# Patient Record
Sex: MALE | Race: WHITE | NOT HISPANIC OR LATINO | Employment: FULL TIME | URBAN - METROPOLITAN AREA
[De-identification: names, ages, dates, MRNs, and addresses within clinical notes are randomized per-mention and may not be internally consistent; named-entity substitution may affect disease eponyms.]

---

## 2017-07-29 ENCOUNTER — APPOINTMENT (OUTPATIENT)
Dept: LAB | Facility: MEDICAL CENTER | Age: 52
End: 2017-07-29
Payer: COMMERCIAL

## 2017-07-29 ENCOUNTER — TRANSCRIBE ORDERS (OUTPATIENT)
Dept: ADMINISTRATIVE | Facility: HOSPITAL | Age: 52
End: 2017-07-29

## 2017-07-29 DIAGNOSIS — Z00.8 HEALTH EXAMINATION IN POPULATION SURVEY: ICD-10-CM

## 2017-07-29 DIAGNOSIS — Z00.8 HEALTH EXAMINATION IN POPULATION SURVEY: Primary | ICD-10-CM

## 2017-07-29 LAB
CHOLEST SERPL-MCNC: 226 MG/DL (ref 50–200)
EST. AVERAGE GLUCOSE BLD GHB EST-MCNC: 128 MG/DL
HBA1C MFR BLD: 6.1 % (ref 4.2–6.3)
HDLC SERPL-MCNC: 44 MG/DL (ref 40–60)
LDLC SERPL CALC-MCNC: 141 MG/DL (ref 0–100)
TRIGL SERPL-MCNC: 205 MG/DL

## 2017-07-29 PROCEDURE — 83036 HEMOGLOBIN GLYCOSYLATED A1C: CPT

## 2017-07-29 PROCEDURE — 80061 LIPID PANEL: CPT

## 2017-07-29 PROCEDURE — 36415 COLL VENOUS BLD VENIPUNCTURE: CPT

## 2019-06-04 ENCOUNTER — OFFICE VISIT (OUTPATIENT)
Dept: FAMILY MEDICINE CLINIC | Facility: CLINIC | Age: 54
End: 2019-06-04
Payer: COMMERCIAL

## 2019-06-04 VITALS
TEMPERATURE: 99.5 F | BODY MASS INDEX: 38.25 KG/M2 | DIASTOLIC BLOOD PRESSURE: 92 MMHG | SYSTOLIC BLOOD PRESSURE: 140 MMHG | OXYGEN SATURATION: 95 % | HEART RATE: 84 BPM | WEIGHT: 259 LBS

## 2019-06-04 DIAGNOSIS — M17.11 ARTHRITIS OF RIGHT KNEE: Primary | ICD-10-CM

## 2019-06-04 DIAGNOSIS — Z13.1 SCREENING FOR DIABETES MELLITUS: ICD-10-CM

## 2019-06-04 DIAGNOSIS — Z13.220 SCREENING, LIPID: ICD-10-CM

## 2019-06-04 PROCEDURE — 80053 COMPREHEN METABOLIC PANEL: CPT | Performed by: FAMILY MEDICINE

## 2019-06-04 PROCEDURE — 36415 COLL VENOUS BLD VENIPUNCTURE: CPT | Performed by: FAMILY MEDICINE

## 2019-06-04 PROCEDURE — 85027 COMPLETE CBC AUTOMATED: CPT | Performed by: FAMILY MEDICINE

## 2019-06-04 PROCEDURE — 86430 RHEUMATOID FACTOR TEST QUAL: CPT | Performed by: FAMILY MEDICINE

## 2019-06-04 PROCEDURE — 99214 OFFICE O/P EST MOD 30 MIN: CPT | Performed by: FAMILY MEDICINE

## 2019-06-04 PROCEDURE — 80061 LIPID PANEL: CPT | Performed by: FAMILY MEDICINE

## 2019-06-04 PROCEDURE — 86618 LYME DISEASE ANTIBODY: CPT | Performed by: FAMILY MEDICINE

## 2019-06-04 PROCEDURE — 86140 C-REACTIVE PROTEIN: CPT | Performed by: FAMILY MEDICINE

## 2019-06-04 RX ORDER — FAMOTIDINE 10 MG
10 TABLET ORAL 2 TIMES DAILY
COMMUNITY

## 2019-06-04 RX ORDER — NAPROXEN 500 MG/1
500 TABLET ORAL 2 TIMES DAILY WITH MEALS
Qty: 60 TABLET | Refills: 0 | Status: SHIPPED | OUTPATIENT
Start: 2019-06-04 | End: 2019-08-20

## 2019-06-05 ENCOUNTER — HOSPITAL ENCOUNTER (OUTPATIENT)
Dept: RADIOLOGY | Facility: HOSPITAL | Age: 54
Discharge: HOME | End: 2019-06-05
Attending: FAMILY MEDICINE
Payer: COMMERCIAL

## 2019-06-05 DIAGNOSIS — M17.11 ARTHRITIS OF RIGHT KNEE: ICD-10-CM

## 2019-06-05 LAB
ALBUMIN SERPL BCP-MCNC: 3.9 G/DL (ref 3.5–5)
ALP SERPL-CCNC: 67 U/L (ref 46–116)
ALT SERPL W P-5'-P-CCNC: 35 U/L (ref 12–78)
ANION GAP SERPL CALCULATED.3IONS-SCNC: 5 MMOL/L (ref 4–13)
AST SERPL W P-5'-P-CCNC: 17 U/L (ref 5–45)
BILIRUB SERPL-MCNC: 0.46 MG/DL (ref 0.2–1)
BUN SERPL-MCNC: 15 MG/DL (ref 5–25)
CALCIUM SERPL-MCNC: 8.6 MG/DL (ref 8.3–10.1)
CHLORIDE SERPL-SCNC: 105 MMOL/L (ref 100–108)
CHOLEST SERPL-MCNC: 228 MG/DL (ref 50–200)
CO2 SERPL-SCNC: 27 MMOL/L (ref 21–32)
CREAT SERPL-MCNC: 0.94 MG/DL (ref 0.6–1.3)
CRP SERPL QL: <3 MG/L
ERYTHROCYTE [DISTWIDTH] IN BLOOD BY AUTOMATED COUNT: 13.5 % (ref 11.6–15.1)
GFR SERPL CREATININE-BSD FRML MDRD: 92 ML/MIN/1.73SQ M
GLUCOSE P FAST SERPL-MCNC: 92 MG/DL (ref 65–99)
HCT VFR BLD AUTO: 51.1 % (ref 36.5–49.3)
HDLC SERPL-MCNC: 47 MG/DL (ref 40–60)
HGB BLD-MCNC: 16.9 G/DL (ref 12–17)
LDLC SERPL CALC-MCNC: 149 MG/DL (ref 0–100)
MCH RBC QN AUTO: 32.1 PG (ref 26.8–34.3)
MCHC RBC AUTO-ENTMCNC: 33.1 G/DL (ref 31.4–37.4)
MCV RBC AUTO: 97 FL (ref 82–98)
NONHDLC SERPL-MCNC: 181 MG/DL
PLATELET # BLD AUTO: 245 THOUSANDS/UL (ref 149–390)
PMV BLD AUTO: 9.9 FL (ref 8.9–12.7)
POTASSIUM SERPL-SCNC: 4.2 MMOL/L (ref 3.5–5.3)
PROT SERPL-MCNC: 7.4 G/DL (ref 6.4–8.2)
RBC # BLD AUTO: 5.26 MILLION/UL (ref 3.88–5.62)
SODIUM SERPL-SCNC: 137 MMOL/L (ref 136–145)
TRIGL SERPL-MCNC: 158 MG/DL
WBC # BLD AUTO: 7.89 THOUSAND/UL (ref 4.31–10.16)

## 2019-06-05 PROCEDURE — 73562 X-RAY EXAM OF KNEE 3: CPT

## 2019-06-07 LAB — RHEUMATOID FACT SER QL LA: NEGATIVE

## 2019-06-08 LAB
B BURGDOR IGG SER IA-ACNC: 0.33
B BURGDOR IGM SER IA-ACNC: 0.74

## 2019-06-10 DIAGNOSIS — M17.11 ARTHRITIS OF RIGHT KNEE: Primary | ICD-10-CM

## 2019-07-18 VITALS
BODY MASS INDEX: 39.55 KG/M2 | DIASTOLIC BLOOD PRESSURE: 89 MMHG | HEART RATE: 80 BPM | HEIGHT: 69 IN | WEIGHT: 267 LBS | SYSTOLIC BLOOD PRESSURE: 142 MMHG

## 2019-07-18 DIAGNOSIS — M17.11 ARTHRITIS OF RIGHT KNEE: Primary | ICD-10-CM

## 2019-07-18 PROCEDURE — 99203 OFFICE O/P NEW LOW 30 MIN: CPT | Performed by: ORTHOPAEDIC SURGERY

## 2019-07-18 PROCEDURE — 20610 DRAIN/INJ JOINT/BURSA W/O US: CPT | Performed by: ORTHOPAEDIC SURGERY

## 2019-07-18 RX ADMIN — BETAMETHASONE SODIUM PHOSPHATE AND BETAMETHASONE ACETATE 6 MG: 3; 3 INJECTION, SUSPENSION INTRA-ARTICULAR; INTRALESIONAL; INTRAMUSCULAR; SOFT TISSUE at 16:00

## 2019-07-18 RX ADMIN — BUPIVACAINE HYDROCHLORIDE 2 ML: 2.5 INJECTION, SOLUTION INFILTRATION; PERINEURAL at 16:00

## 2019-07-18 RX ADMIN — LIDOCAINE HYDROCHLORIDE 2 ML: 10 INJECTION, SOLUTION EPIDURAL; INFILTRATION; INTRACAUDAL; PERINEURAL at 16:00

## 2019-07-18 NOTE — PROGRESS NOTES
Orthopaedic Surgery - Office Note  Grady Odom (47 y o  male)   : 1965   MRN: [id number]  Encounter Date: 2019    Chief Complaint   Patient presents with    Right Knee - Pain       Assessment / Plan  Right knee osteoarthritis    · Patient will begin outpatient PT   · Patient was advised to begin with Tylenol arthritis for pain  · A steroid injection was provided to the right knee joint today and tolerated well  · WBAT, activities as tolerated  · Patient was educated that weight loss will improve him symptoms  Return in about 1 month (around 8/15/2019)  History of Present Illness  Grady Odom is a 49 y o  male who presents initial evaluation regarding his right knee pain  He has been having right knee pain waxing and waning for few years  This recent onset began about 6 weeks to 2 months ago  He denies any obvious injury or trauma  He localizes pain to the medial aspect of the right knee  This does not radiate and is not associated numbness or tingling  He was initially treated by his PCP and prescribed him naproxen  He states that this upset his stomach and he is now taking ibuprofen  He complains of pain with steps, squatting and kneeling  His knee becomes stiff after sitting for long periods of time  Review of Systems  Pertinent items are noted in HPI  All other systems were reviewed and are negative  Physical Exam  /89   Pulse 80   Ht 5' 9" (1 753 m)   Wt 121 kg (267 lb)   BMI 39 43 kg/m²   Cons: Appears well  No apparent distress  Psych: Alert  Oriented x3  Mood and affect normal   Eyes: PERRLA, EOMI  Resp: Normal effort  No audible wheezing or stridor  CV: Palpable pulse  No discernable arrhythmia  No LE edema  Lymph:  No palpable cervical, axillary, or inguinal lymphadenopathy  Skin: Warm  No palpable masses  No visible lesions  Neuro: Normal muscle tone  Normal and symmetric DTR's       Right Knee Exam  Alignment:  Normal knee alignment  Inspection:  No swelling  No erythema  No ecchymosis  Palpation:  Medial joint line tenderness  ROM:  Normal knee ROM  Strength:  5/5 quadriceps and hamstrings  Stability:  No objective knee instability  Stable Varus / Valgus stress, Lachman, and Posterior drawer  Tests:  No pertinent positive or negative tests  Patella:  Patella tracks centrally with crepitus  Neurovascular:  Sensation intact in DP/SP/Shin/Sa/T nerve distributions  2+ DP & PT pulses  Brisk capillary refill in all toes  Toes warm and perfused  Gait:  Normal     Studies Reviewed  I have personally reviewed pertinent films in PACS and my interpretation is X-rays of the right knee performed 06/05/2019 show mild narrowing of the medial compartment  Large joint arthrocentesis: R knee  Date/Time: 7/18/2019 4:00 PM  Consent given by: patient  Site marked: site marked  Timeout: Immediately prior to procedure a time out was called to verify the correct patient, procedure, equipment, support staff and site/side marked as required   Supporting Documentation  Indications: pain   Procedure Details  Location: knee - R knee  Ultrasound guidance: no  Approach: anterolateral  Medications administered: 2 mL lidocaine (PF) 1 %; 2 mL bupivacaine 0 25 %; 6 mg betamethasone acetate-betamethasone sodium phosphate 6 (3-3) mg/mL    Patient tolerance: patient tolerated the procedure well with no immediate complications  Dressing:  Sterile dressing applied             Medical, Surgical, Family, and Social History  The patient's medical history, family history, and social history, were reviewed and updated as appropriate  Past Medical History:   Diagnosis Date    Acute suppurative otitis media of both ears without spontaneous rupture of tympanic membranes 9/2/2016    Other tenosynovitis of hand and wrist 9/26/2013       History reviewed  No pertinent surgical history  History reviewed  No pertinent family history      Social History Occupational History    Not on file   Tobacco Use    Smoking status: Current Every Day Smoker    Smokeless tobacco: Never Used   Substance and Sexual Activity    Alcohol use: Yes     Frequency: Monthly or less    Drug use: Never    Sexual activity: Not on file       No Known Allergies      Current Outpatient Medications:     famotidine (PEPCID) 10 mg tablet, Take 10 mg by mouth 2 (two) times a day, Disp: , Rfl:     naproxen (NAPROSYN) 500 mg tablet, Take 1 tablet (500 mg total) by mouth 2 (two) times a day with meals (Patient not taking: Reported on 7/18/2019), Disp: 60 tablet, Rfl: 0      Estefanía Rod MA    Scribe Attestation    I,:   Estefanía Rod MA am acting as a scribe while in the presence of the attending physician :        I,:   Nancie Hopper MD personally performed the services described in this documentation    as scribed in my presence :

## 2019-07-21 RX ORDER — LIDOCAINE HYDROCHLORIDE 10 MG/ML
2 INJECTION, SOLUTION EPIDURAL; INFILTRATION; INTRACAUDAL; PERINEURAL
Status: COMPLETED | OUTPATIENT
Start: 2019-07-18 | End: 2019-07-18

## 2019-07-21 RX ORDER — BETAMETHASONE SODIUM PHOSPHATE AND BETAMETHASONE ACETATE 3; 3 MG/ML; MG/ML
6 INJECTION, SUSPENSION INTRA-ARTICULAR; INTRALESIONAL; INTRAMUSCULAR; SOFT TISSUE
Status: COMPLETED | OUTPATIENT
Start: 2019-07-18 | End: 2019-07-18

## 2019-07-21 RX ORDER — BUPIVACAINE HYDROCHLORIDE 2.5 MG/ML
2 INJECTION, SOLUTION INFILTRATION; PERINEURAL
Status: COMPLETED | OUTPATIENT
Start: 2019-07-18 | End: 2019-07-18

## 2019-07-23 ENCOUNTER — EVALUATION (OUTPATIENT)
Dept: PHYSICAL THERAPY | Facility: CLINIC | Age: 54
End: 2019-07-23
Payer: COMMERCIAL

## 2019-07-23 DIAGNOSIS — M17.11 ARTHRITIS OF RIGHT KNEE: ICD-10-CM

## 2019-07-23 PROCEDURE — 97161 PT EVAL LOW COMPLEX 20 MIN: CPT | Performed by: PHYSICAL THERAPIST

## 2019-07-23 RX ORDER — IBUPROFEN 200 MG
TABLET ORAL ONCE
COMMUNITY
End: 2019-08-20

## 2019-07-23 NOTE — PROGRESS NOTES
PT Evaluation     Today's date: 2019  Patient name: Vanessa Choudhary  : 1965  MRN: [id number]  Referring provider: Mark Ambriz MD  Dx:   Encounter Diagnosis     ICD-10-CM    1  Arthritis of right knee M17 11 Ambulatory referral to Physical Therapy       Start Time: 917  Stop Time: 958  Total time in clinic (min): 41 minutes    ASSESSMENT/PLAN: Patient presents to [facility] with R knee pain causing decreased functional mobility  Patient symptoms are consistent with OA as he has more pain in the morning and improves as he gets moving, also has limited knee flexion PROM on the R  Patient has good LE strength but lacks eccentric control with functional movements  Patient would benefit from skilled physical therapy to decrease knee pain and improve function  Frequency/Duration: 2x per week, 6-8 weeks    STG:(4 weeks)  1) Patient R LE strength will be WNL and pain free  2) Patient pain at worst will be 6/10 or less  3) Patient right knee flexion PROM will be >120 degrees  4) Patient will step down from 6" step leading w/ L, pain <4/10    LTG:(8 weeks)  1) Patient pain at worst will be 3/10 or less  2) Patient R knee flexion PROM will be within 3 degrees of L knee  3) Patient will ascend/descend flight of stairs step over step w/ pain <3/10  4) Patient will be able to walk 2 blocks w/ no difficulty  Interventions: manual therapy, ROM, stretching, strengthening, therapeutic activities, neuromuscular re-ed and instruction in HEP  SUBJECTIVE: Patient reports having pain in the medial side of right knee for about one year, mostly when he bends it  Also had pain shoot towards the posterior knee last night  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[employer], 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  He is trying to stay off of it as much as he can, so having difficulty doing stuff at home  Had an injection in the knee 5 days ago, feels like it made it worse  When the pain comes on it feels better to straighten the knee  Pain is worse in the morning, feels better as he starts to move it around but hurts again at the end of the day  Patient goal is to get rd of the pain  Pain levels:    Best- 0/10   Worst- 8-9/10    OBJECTIVE:      Palpation: Mild tenderness to palpation medial knee    Gait: Slightly wide base of support, limited hip rotation    Functional Strength:    Step up - WNL bilateral, no pain   Step Down - good control leading with R, poor control and pain leading w/ L   Sit to Stand - able to perform w/o using UE, no pain    Special tests:    Varus stress - R brought on medial knee pain   Valgus stress - normal bilateral    Lumbar Screen   Flexion - WNL and brought on mild knee pain    Extension - mild limitation no pain   Lateral Flexion - WNL no pain    Patellar Mobility   WNL all directions, superior and inferior glides caused some pain and discomfort  Hip      Strength       AROM    PROM                R               L                  R               L             R             L   Abduction 5/5 5/5       Flexion 5/5* 5/5   WNL WNL   Extension 5/5* 5/5   WNL WNL   ER 5/5 5/5   WNL WNL   IR 4+/5* 5/5   0* 6   Adduction 5/5 5/5                  Knee/Ankle      Strength       AROM    PROM                R               L                  R               L             R             L   Flexion 5/5 5/5   112* overpr 129   Extension 5/5 5/5   1 HE 1 HE   PF         DF 5/5 5/5       Inversion 5/5 5/5       Eversion 5/5 5/5                           Neruo screen:   Sensation - intact to light touch   Strength - vinny  LE strength WNL   Reflexes - WNL vinny   Achilles/quad            Flowsheet Rows      Most Recent Value   PT/OT G-Codes   Current Score  41   Projected Score  59             Daily Treatment Record:    POC expires: 8/23  PRECAUTIONS: NONE  PMH: UNREMARKABLE      Manual  7/23            R knee PROM             R knee distraction             R patellar mobs sup/inf             Hip Lateral distraction                               Exercise Diary  7/23            Bike             Step ups 6"             Step downs 4"             Mini Lunge             AROM knee flexion             Supine hip IR straight leg             Reverse clam shell IR             AAROM t-band IR             Lat stepping in squat             Sit-to-stand controlled sit                                                                                                                                                   Modalities  7/23            CP R knee

## 2019-07-25 ENCOUNTER — OFFICE VISIT (OUTPATIENT)
Dept: PHYSICAL THERAPY | Facility: CLINIC | Age: 54
End: 2019-07-25
Payer: COMMERCIAL

## 2019-07-25 DIAGNOSIS — M17.11 ARTHRITIS OF RIGHT KNEE: Primary | ICD-10-CM

## 2019-07-25 PROCEDURE — 97140 MANUAL THERAPY 1/> REGIONS: CPT

## 2019-07-25 PROCEDURE — 97110 THERAPEUTIC EXERCISES: CPT

## 2019-07-25 NOTE — PROGRESS NOTES
Daily Note     Today's date: 2019  Patient name: Eunice Cardenas  : 1965  MRN: [id number]  Referring provider: Sarita Hernandez MD  Dx:   Encounter Diagnosis     ICD-10-CM    1  Arthritis of right knee M17 11                   Subjective: Patient noted " I worked night shift and the pain in my R knee wasn't too bad just a little right here ( medial knee) "      Objective: See treatment diary below      Assessment: Tolerated treatment well  Patient noted no pain throughout treatment while performing exercises  Slight VC to correct form for lateral stepping in squat  Patient responded well to manual treatment with no pain noted  Patient noted no pain post treatment  Patient would benefit from continued PT      Plan: Continue per plan of care        Daily Treatment Record:    POC expires:   PRECAUTIONS: NONE  PMH: UNREMARKABLE      Manual             R knee PROM  af           R knee distraction             R patellar mobs sup/inf  patella PROM af           Hip Lateral distraction                               Exercise Diary             Bike  10'           Step ups 6"  10xea           Step downs 4"  10xea           Mini Lunge  10xea           AROM knee flexion             Supine hip IR straight leg  10x           Reverse clam shell IR  10x           AAROM t-band IR             Lat stepping in squat  3 laps            Sit-to-stand controlled sit  15x                                                                                                                                                 Modalities             CP R knee  Not needed

## 2019-07-30 ENCOUNTER — OFFICE VISIT (OUTPATIENT)
Dept: PHYSICAL THERAPY | Facility: CLINIC | Age: 54
End: 2019-07-30
Payer: COMMERCIAL

## 2019-07-30 DIAGNOSIS — M17.11 ARTHRITIS OF RIGHT KNEE: Primary | ICD-10-CM

## 2019-07-30 PROCEDURE — 97110 THERAPEUTIC EXERCISES: CPT | Performed by: PHYSICAL THERAPIST

## 2019-07-30 PROCEDURE — 97140 MANUAL THERAPY 1/> REGIONS: CPT | Performed by: PHYSICAL THERAPIST

## 2019-07-30 NOTE — PROGRESS NOTES
Daily Note     Today's date: 2019  Patient name: Anya Oropeza  : 1965  MRN: [id number]  Referring provider: Cheryln Cowden, MD  Dx:   Encounter Diagnosis     ICD-10-CM    1  Arthritis of right knee M17 11                   Subjective:  Patient reports feeling tightness 2 days after the last PT session in distal R quadriceps, but OK today  Objective:  See treatment diary below      Assessment:  Pt had little R knee pain today with all exercises  R H/S and R patellar mobility are still very limited and cause pain at times especially with WB activities  Patient would benefit from continued PT  Plan:  Add light weights to LE PREs          Daily Treatment Record:      POC expires:   PRECAUTIONS: NONE  PMH: UNREMARKABLE      Manual            R knee PROM  af           R knee distraction             R patellar mobs sup/inf  patella PROM af 5'          R H/S stretch   5'                           Exercise Diary            Bike  10' 10'          Step ups 6"  10xea D/C          Step downs 4"  10xea D/C          Mini Lunge  10xea NV          AROM knee flexion             Supine R straight leg  10x 30          Reverse clam shell IR  10x NP          AAROM t-band IR             Lat stepping in squat at ll bars  3 laps  5 laps x 10ft          Sit-to-stand controlled sit  15x 10          Step overs up R, down L   8" 20          8" step stretch R knee flex   10" 10          LAQ R   30 3#         Strap R H/S stretch supine   20" 10          Bridge with ball adduction   NV          SAQ R   NV                                                                  Modalities             CP R knee  Not needed

## 2019-08-01 ENCOUNTER — OFFICE VISIT (OUTPATIENT)
Dept: PHYSICAL THERAPY | Facility: CLINIC | Age: 54
End: 2019-08-01
Payer: COMMERCIAL

## 2019-08-01 DIAGNOSIS — M17.11 ARTHRITIS OF RIGHT KNEE: Primary | ICD-10-CM

## 2019-08-01 PROCEDURE — 97110 THERAPEUTIC EXERCISES: CPT | Performed by: PHYSICAL THERAPIST

## 2019-08-01 PROCEDURE — 97140 MANUAL THERAPY 1/> REGIONS: CPT | Performed by: PHYSICAL THERAPIST

## 2019-08-01 NOTE — PROGRESS NOTES
Daily Note     Today's date: 2019  Patient name: Juliette Kerns  : 1965  MRN: [id number]  Referring provider: Lon Blair MD  Dx:   Encounter Diagnosis     ICD-10-CM    1  Arthritis of right knee M17 11                   Subjective: Pt had no tightness in his R quadricep after last visit  Pt is experiencing increased medial knee pain rated 3-4/10 after work activies      Objective: See treatment diary below      Assessment: Tolerated treatment well  Patient exhibited good technique with therapeutic exercises      Plan: Continue per plan of care  trial of K tape for support       Daily Treatment Record:      POC expires:   PRECAUTIONS: NONE  PMH: UNREMARKABLE      Manual           R knee PROM  af  th         R knee distraction             R patellar mobs sup/inf  patella PROM af 5' th         R H/S stretch   5'          GISTM R quad    th             Exercise Diary           Bike  10' 10' 10'         Step ups 6"  10xea D/C          Step downs 4"  10xea D/C          Mini Lunge  10xea NV 10 B         AROM knee flexion             Supine R straight leg  10x 30 2x15         Reverse clam shell IR  10x NP 10         AAROM t-band IR             Lat stepping in squat at ll bars  3 laps  5 laps x 10ft 4 laps P TB         Sit-to-stand controlled sit  15x 10 10         Step overs up R, down L   8" 20          8" step stretch R knee flex   10" 10 10 x10"         LAQ R   30 3# x15         Strap R H/S stretch supine   20" 10 20"x3         Bridge with ball adduction   NV 10 x10"         SAQ R   NV 3# x15                                                                 Modalities             CP R knee  Not needed

## 2019-08-06 ENCOUNTER — OFFICE VISIT (OUTPATIENT)
Dept: PHYSICAL THERAPY | Facility: CLINIC | Age: 54
End: 2019-08-06
Payer: COMMERCIAL

## 2019-08-06 DIAGNOSIS — M17.11 ARTHRITIS OF RIGHT KNEE: Primary | ICD-10-CM

## 2019-08-06 PROCEDURE — 97110 THERAPEUTIC EXERCISES: CPT | Performed by: PHYSICAL THERAPIST

## 2019-08-06 PROCEDURE — 97140 MANUAL THERAPY 1/> REGIONS: CPT | Performed by: PHYSICAL THERAPIST

## 2019-08-06 PROCEDURE — 97530 THERAPEUTIC ACTIVITIES: CPT | Performed by: PHYSICAL THERAPIST

## 2019-08-06 PROCEDURE — 97112 NEUROMUSCULAR REEDUCATION: CPT | Performed by: PHYSICAL THERAPIST

## 2019-08-08 ENCOUNTER — OFFICE VISIT (OUTPATIENT)
Dept: PHYSICAL THERAPY | Facility: CLINIC | Age: 54
End: 2019-08-08
Payer: COMMERCIAL

## 2019-08-08 DIAGNOSIS — M17.11 ARTHRITIS OF RIGHT KNEE: Primary | ICD-10-CM

## 2019-08-08 PROCEDURE — 97140 MANUAL THERAPY 1/> REGIONS: CPT | Performed by: PHYSICAL THERAPIST

## 2019-08-08 PROCEDURE — 97110 THERAPEUTIC EXERCISES: CPT | Performed by: PHYSICAL THERAPIST

## 2019-08-08 PROCEDURE — 97530 THERAPEUTIC ACTIVITIES: CPT | Performed by: PHYSICAL THERAPIST

## 2019-08-08 PROCEDURE — 97112 NEUROMUSCULAR REEDUCATION: CPT | Performed by: PHYSICAL THERAPIST

## 2019-08-08 NOTE — PROGRESS NOTES
Daily Note     Today's date: 2019  Patient name: Becca Ibrahim  : 1965  MRN: [id number]  Referring provider: Mandy Tanner MD  Dx:   Encounter Diagnosis     ICD-10-CM    1  Arthritis of right knee M17 11        Start Time: 920  Stop Time: 1000  Total time in clinic (min): 40 minutes    Subjective: Patient reports that Tuesday night his medial knee was very painful  He is unsure of what specific activity/movement may have provoked these symptoms  Symptoms are improved today with min pain reported  Objective: See treatment diary below      Assessment: Patient tolerated treatment well and denied any exacerbation of sx following today's session  Quad lag continues to be present with SLR, and patient required increased recovery periods in order to complete all repetitions  In addition, min cueing provided to avoid crossing midline with LE  Continue to address strength deficits, as tolerated  Plan: Continue per plan of care        Daily Treatment Record:      POC expires:   PRECAUTIONS: NONE  PMH: UNREMARKABLE      Manual         R knee PROM  af  th 2' 2'       R knee distraction             R patellar mobs sup/inf  patella PROM af 5' th 5' 6'       R H/S stretch   5'  5'        GISTM R quad    th             Exercise Diary         Bike  10' 10' 10' L3 10' L3 10'       Step ups 6"  10xea D/C          Step downs 4"  10xea D/C          Mini Lunge  10xea NV 10 B 10 L/R nv       AROM knee flexion             Supine R straight leg  10x 30 2x15 3# 2x15 3# 3x10       Reverse clam shell IR  10x NP 10 15 20x       AAROM t-band IR             Lat stepping in squat at ll bars  3 laps  5 laps x 10ft 4 laps P TB 5 laps Blue TB 5 laps blue TB       Sit-to-stand controlled sit  15x 10 10 10 10x       Step overs up R, down L   8" 20  8" 20 8# 20x       8" step stretch R knee flex   10" 10 10 x10" 10" 10 10x10"       LAQ R   30 3# x15 3# 30 3#x30 Strap R H/S stretch supine   20" 10 20"x3 20" 5 5x20"       Bridge with ball adduction   NV 10 x10" 5" 20 20x5"       SAQ R   NV 3# x15 3# 30 3#x30                                                               Modalities  7/23 7/25           CP R knee  Not needed

## 2019-08-13 ENCOUNTER — OFFICE VISIT (OUTPATIENT)
Dept: PHYSICAL THERAPY | Facility: CLINIC | Age: 54
End: 2019-08-13
Payer: COMMERCIAL

## 2019-08-13 DIAGNOSIS — M17.11 ARTHRITIS OF RIGHT KNEE: Primary | ICD-10-CM

## 2019-08-13 PROCEDURE — 97110 THERAPEUTIC EXERCISES: CPT | Performed by: PHYSICAL THERAPIST

## 2019-08-13 PROCEDURE — 97530 THERAPEUTIC ACTIVITIES: CPT | Performed by: PHYSICAL THERAPIST

## 2019-08-13 PROCEDURE — 97112 NEUROMUSCULAR REEDUCATION: CPT | Performed by: PHYSICAL THERAPIST

## 2019-08-13 PROCEDURE — 97140 MANUAL THERAPY 1/> REGIONS: CPT | Performed by: PHYSICAL THERAPIST

## 2019-08-13 NOTE — PROGRESS NOTES
Daily Note     Today's date: 2019  Patient name: Ambrose Piña  : 1965  MRN: [id number]  Referring provider: Bianca Vitlae MD  Dx:   Encounter Diagnosis     ICD-10-CM    1  Arthritis of right knee M17 11                   Subjective:  Patient reports that he has much less pain today vs last week  Still min R knee swelling though  Objective:  See treatment diary below      Assessment:  Patient showed better R knee ROM and H/S flexibility today with min less pain  Better functional movement noted with side lunges and steps  Still mod weakness in R quad with rapid fatigue with supine SLR, but no extensor lag even with increased weight today  Plan:  Continue to focus on quad strengthening  Pt 2x/wk x 3-4 weeks          Daily Treatment Record:      POC expires:   PRECAUTIONS: NONE  PMH: UNREMARKABLE      Manual        R knee PROM  af  th 2' 2' 2'      R knee distraction             R patellar mobs sup/inf  patella PROM af 5' th 5' 6' 3'      R H/S stretch   5'  5'  3'      GISTM R quad    th             Exercise Diary        Bike  10' 10' 10' L3 10' L3 10' L3 11'      Step ups 6"  10xea D/C          Step downs 4"  10xea D/C          Mini Lunge  10xea NV 10 B 10 L/R nv 10 ea      AROM knee flexion             Supine R straight leg  10x 30 2x15 3# 2x15 3# 3x10 4# 3x10      Reverse clam shell IR  10x NP 10 15 20x NP      AAROM t-band IR             Lat stepping in squat at ll bars  3 laps  5 laps x 10ft 4 laps P TB 5 laps Blue TB 5 laps blue TB 5 laps blue TB      Sit-to-stand controlled sit  15x 10 10 10 10x 10      Step overs up R, down L   8" 20  8" 20 8# 20x 8" 20      8" step stretch R knee flex   10" 10 10 x10" 10" 10 10x10" 10" 10      LAQ R   30 3# x15 3# 30 3#x30 4# 30      Strap R H/S stretch supine   20" 10 20"x3 20" 5 5x20" 20" 5      Bridge with ball adduction   NV 10 x10" 5" 20 20x5" 5" 20      SAQ R   NV 3# x15 3# 30 3#x30 4# 30                                                              Modalities  7/23 7/25           CP R knee  Not needed

## 2019-08-15 ENCOUNTER — APPOINTMENT (OUTPATIENT)
Dept: PHYSICAL THERAPY | Facility: CLINIC | Age: 54
End: 2019-08-15
Payer: COMMERCIAL

## 2019-08-20 ENCOUNTER — APPOINTMENT (OUTPATIENT)
Dept: PHYSICAL THERAPY | Facility: CLINIC | Age: 54
End: 2019-08-20
Payer: COMMERCIAL

## 2019-08-20 ENCOUNTER — OFFICE VISIT (OUTPATIENT)
Dept: OBGYN CLINIC | Facility: HOSPITAL | Age: 54
End: 2019-08-20
Payer: COMMERCIAL

## 2019-08-20 VITALS
WEIGHT: 265 LBS | BODY MASS INDEX: 39.25 KG/M2 | SYSTOLIC BLOOD PRESSURE: 136 MMHG | HEART RATE: 85 BPM | DIASTOLIC BLOOD PRESSURE: 81 MMHG | HEIGHT: 69 IN

## 2019-08-20 DIAGNOSIS — M17.11 ARTHRITIS OF RIGHT KNEE: Primary | ICD-10-CM

## 2019-08-20 DIAGNOSIS — S83.411A SPRAIN OF MEDIAL COLLATERAL LIGAMENT OF RIGHT KNEE, INITIAL ENCOUNTER: ICD-10-CM

## 2019-08-20 PROCEDURE — 99213 OFFICE O/P EST LOW 20 MIN: CPT | Performed by: ORTHOPAEDIC SURGERY

## 2019-08-21 NOTE — PROGRESS NOTES
49 y o  male presenting to the office for follow up of right knee pain  Patient states that he is still having a lot of pain in the knee, with days that he can ambulate regularly, and days where he cannot ambulate at all  He did not notice any significant improvement with the steroid injection  He has had good days with PT, but overall has not noticed any significant improvement with PT  He denies any new falls, accidents or injuries  He denies any other acute complaints  ROS  Review of Systems   Constitutional: Negative for fever and unexpected weight change  HENT: Negative for hearing loss, nosebleeds and sore throat  Eyes: Negative for pain, redness and visual disturbance  Respiratory: Negative for cough, shortness of breath and wheezing  Cardiovascular: Negative for chest pain, palpitations and leg swelling  Gastrointestinal: Negative for abdominal pain, nausea and vomiting  Endocrine: Negative for polydipsia and polyuria  Genitourinary: Negative for dysuria and hematuria  Skin: Negative for rash and wound  Neurological: Negative for dizziness and headaches  Psychiatric/Behavioral: Negative for agitation and suicidal ideas  Past Medical History:   Diagnosis Date    Acute suppurative otitis media of both ears without spontaneous rupture of tympanic membranes 9/2/2016    Arthritis     Other tenosynovitis of hand and wrist 9/26/2013     History reviewed  No pertinent surgical history  Results Reviewed     None          Physical Exam  Physical Exam   Constitutional: He is oriented to person, place, and time  He appears well-developed and well-nourished  HENT:   Head: Normocephalic and atraumatic  Eyes: Pupils are equal, round, and reactive to light  EOM are normal    Neck: Neck supple  No tracheal deviation present  Cardiovascular: Normal rate and regular rhythm  Pulmonary/Chest: Effort normal and breath sounds normal    Abdominal: There is no guarding     Musculoskeletal: Right knee: He exhibits no effusion  Neurological: He is alert and oriented to person, place, and time  Skin: Skin is warm and dry  Psychiatric: He has a normal mood and affect  His behavior is normal      Right Knee Exam     Muscle Strength   The patient has normal right knee strength  Tenderness   The patient is experiencing tenderness in the MCL  Range of Motion   The patient has normal right knee ROM  Tests   Varus: positive Valgus: negative    Other   Erythema: absent  Sensation: normal  Swelling: none  Effusion: no effusion present        Assessment/Plan  49 y o  male    1  Arthritis of right knee  - diclofenac sodium (VOLTAREN) 1 %; Apply 2 g topically 4 (four) times a day  Dispense: 1 Tube; Refill: 1    2  Sprain of medial collateral ligament of right knee, initial encounter  - diclofenac sodium (VOLTAREN) 1 %; Apply 2 g topically 4 (four) times a day  Dispense: 1 Tube; Refill: 1  - ordered a brace for patient to help with some stability  Recommend a change to PT to focus on MCL more closely  Follow up in 4 weeks for re-evaluation   May consider MRI at that time

## 2019-08-22 ENCOUNTER — EVALUATION (OUTPATIENT)
Dept: PHYSICAL THERAPY | Facility: CLINIC | Age: 54
End: 2019-08-22
Payer: COMMERCIAL

## 2019-08-22 DIAGNOSIS — M17.11 ARTHRITIS OF RIGHT KNEE: Primary | ICD-10-CM

## 2019-08-22 PROCEDURE — 97112 NEUROMUSCULAR REEDUCATION: CPT | Performed by: PHYSICAL THERAPIST

## 2019-08-22 PROCEDURE — 97140 MANUAL THERAPY 1/> REGIONS: CPT | Performed by: PHYSICAL THERAPIST

## 2019-08-22 PROCEDURE — 97110 THERAPEUTIC EXERCISES: CPT | Performed by: PHYSICAL THERAPIST

## 2019-08-22 PROCEDURE — 97530 THERAPEUTIC ACTIVITIES: CPT | Performed by: PHYSICAL THERAPIST

## 2019-08-22 NOTE — PROGRESS NOTES
PT Evaluation     Today's date: 2019  Patient name: Nakul Borges  : 1965  MRN: [id number]  Referring provider: Mateo Boyd MD  Dx:   Encounter Diagnosis     ICD-10-CM    1  Arthritis of right knee M17 11                   ASSESSMENT/PLAN:  Patient presented to [facility] with R knee pain causing decreased functional mobility  Patient symptoms are consistent with OA as he has more pain in the morning and usually improves as he gets moving, also had limited knee flexion PROM on the R, but that has improved with PT  Patient has good overall LE strength  He will continue to benefit from skilled physical therapy to decrease knee pain and improve function  Pt felt mod knee pain relief after starting new wedge stretch for R calf  FOTO scores show min positive changes  Frequency/Duration: 2x per week, 4 weeks    STG:(2 weeks)  1) Patient R LE strength will be WNL and pain free - Partially Met  2) Patient pain at worst will be 6/10 or less - Mostly Met  3) Patient right knee flexion PROM will be >120 degrees - Met  4) Patient will step down from 6" step leading w/ L, pain <4/10 - Met    LTG:(4 weeks)  1) Patient pain at worst will be 3/10 or less - Not Met  2) Patient R knee flexion PROM will be within 3 degrees of L knee - Met  3) Patient will ascend/descend flight of stairs step over step w/ pain <3/10 - Met  4) Patient will be able to walk 2 blocks w/ no difficulty - Met  5) No tightness R LE in 4 weeks   6) FOTO = 59/100 in 4 weeks    Interventions: manual therapy, ROM, stretching, strengthening, therapeutic activities, neuromuscular re-ed and instruction in HEP  SUBJECTIVE:  Patient reports having pain in the medial side of right knee for about one year, mostly when he bends it  That has improved with PT, but still has times when R knee is very painful without cause    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[employer], 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   Has new pain cream that MD gave him yesterday but has not used it yet  When the pain comes on it feels better to straighten the knee  Pain is worse in the morning, feels better as he starts to move it around but hurts again at the end of the day  Patient goal is to get rd of the pain  Pain levels:    Best- 0/10   Worst- 6/10    OBJECTIVE:      Palpation: Mild tenderness to palpation medial knee    Gait: WNL    Special tests:    Varus stress - R brought on medial knee pain   Valgus stress - normal bilateral    Patellar Mobility   WNL all directions, superior and inferior glides caused some pain and discomfort  Hip      Strength       AROM    PROM                R               L                  R               L             R             L   Abduction 5/5 5/5       Flexion 5/5 5/5   WNL WNL   Extension 5/5 5/5   WNL WNL   ER 5/5 5/5   WNL WNL   IR 5/5 5/5       Adduction 5/5 5/5                  Knee/Ankle      Strength       AROM    PROM                R               L                  R               L             R             L   Flexion 5/5 5/5   112*  129   Extension 5/5 5/5   0 0   PF         DF 5/5 5/5       Inversion 5/5 5/5       Eversion 5/5 5/5                           Neruo screen:   Sensation - intact to light touch   Strength - vinny  LE strength WNL   Reflexes - WNL vinny   Achilles/quad                   Daily Treatment Record:    POC expires: 9/19/19  PRECAUTIONS: NONE  PMH: UNREMARKABLE        Manual  8/22              R knee PROM  2'              R patellar mobs sup/inf  3'              R H/S stretch  3'                                    Exercise Diary  8/22              Bike  L3 10'              Mini Lunge  10 R              Supine R straight leg  4# 3x10              Lat stepping in squat at ll bars  5 laps blue TB              Step overs up R, down L  8" 20              8" step stretch R knee flex  10" 10              LAQ R  4# 30              Strap R H/S stretch supine  20" 5       Bridge with ball adduction  5" 20              SAQ R  4# 30               Wedge calf stretch R 20" 5                                                                                      Modalities                      CP R knee

## 2019-08-27 ENCOUNTER — OFFICE VISIT (OUTPATIENT)
Dept: PHYSICAL THERAPY | Facility: CLINIC | Age: 54
End: 2019-08-27
Payer: COMMERCIAL

## 2019-08-27 DIAGNOSIS — M17.11 ARTHRITIS OF RIGHT KNEE: Primary | ICD-10-CM

## 2019-08-27 PROCEDURE — 97112 NEUROMUSCULAR REEDUCATION: CPT

## 2019-08-27 PROCEDURE — 97110 THERAPEUTIC EXERCISES: CPT

## 2019-08-27 PROCEDURE — 97530 THERAPEUTIC ACTIVITIES: CPT

## 2019-08-27 PROCEDURE — 97140 MANUAL THERAPY 1/> REGIONS: CPT

## 2019-08-27 NOTE — PROGRESS NOTES
Daily Note     Today's date: 2019  Patient name: Pat Hooper  : 1965  MRN: [id number]  Referring provider: Roxana Mancilla MD  Dx:   Encounter Diagnosis     ICD-10-CM    1  Arthritis of right knee M17 11                   Subjective: Pt reports feeling "a little sore" this AM   Notes this pain is "nothing like it used to be though "      Objective: See treatment diary below      Assessment: Tolerated treatment well  Was able to perform all exercise with no significant c/o increased pain  Occasional verbal cues required for proper form/intensity with assigned exercise throughout session  Continues to be most challenged with resisted SLR, demonstrating slight quad lag for last few reps  Patient would benefit from continued PT to further improve strength, reduce pain levels, and improve function  Plan: Continue per plan of care  Progress as able       Daily Treatment Record:    POC expires: 19  PRECAUTIONS: NONE  PMH: UNREMARKABLE        Manual               R knee PROM  2' 2'             R patellar mobs sup/inf  3' 3'             R H/S stretch  3' 3'                                   Exercise Diary               Bike  L3 10' L3 10'             Mini Lunge  10 R 10 R             Supine R straight leg  4# 3x10 4# 3x10             Lat stepping in squat at ll bars  5 laps blue TB 5 laps  Blue  TB             Step overs up R, down L  8" 20 8"  x20             8" step stretch R knee flex  10" 10 10"x10             LAQ R  4# 30 4# x30             Strap R H/S stretch supine  20" 5 20"x5             Bridge with ball adduction  5" 20 5" 20             SAQ R  4# 30 4# x30              Wedge calf stretch R 20" 5  30"x3                                                                                    Modalities                      CP R knee

## 2019-08-29 ENCOUNTER — OFFICE VISIT (OUTPATIENT)
Dept: PHYSICAL THERAPY | Facility: CLINIC | Age: 54
End: 2019-08-29
Payer: COMMERCIAL

## 2019-08-29 DIAGNOSIS — M17.11 ARTHRITIS OF RIGHT KNEE: Primary | ICD-10-CM

## 2019-08-29 PROCEDURE — 97112 NEUROMUSCULAR REEDUCATION: CPT | Performed by: PHYSICAL THERAPIST

## 2019-08-29 PROCEDURE — 97110 THERAPEUTIC EXERCISES: CPT | Performed by: PHYSICAL THERAPIST

## 2019-08-29 PROCEDURE — 97140 MANUAL THERAPY 1/> REGIONS: CPT | Performed by: PHYSICAL THERAPIST

## 2019-08-29 NOTE — PROGRESS NOTES
Daily Note     Today's date: 2019  Patient name: John Torres  : 1965  MRN: [id number]  Referring provider: Hiral Sanchez MD  Dx:   Encounter Diagnosis     ICD-10-CM    1  Arthritis of right knee M17 11        Start Time: 945  Stop Time:   Total time in clinic (min): 45 minutes    Subjective: states he is feeling better; still has some medial R knee pain but not like it was; c/o intermittent clicking in the R knee      Objective: See treatment diary below      Assessment: Tolerated treatment well but reports some medial knee pain end range flexion, improved somewhat p manuals  Patient would benefit from continued PT      Plan: Continue per plan of care        Daily Treatment Record:    POC expires: 19  PRECAUTIONS: NONE  PMH: UNREMARKABLE        Manual              R knee PROM  2' 2' 3'            R patellar mobs sup/inf  3' 3' 3'            R H/S stretch  3' 3'             R tib-fem mobs    2'                  Exercise Diary              Bike  L3 10' L3 10' L3 10'            Mini Lunge  10 R 10 R 10xR            Supine R straight leg  4# 3x10 4# 3x10 4# 3x10            Lat stepping in squat at ll bars  5 laps blue TB 5 laps  Blue  TB 5 laps BTB            Step overs up R, down L  8" 20 8"  x20 8" 20x            8" step stretch R knee flex  10" 10 10"x10 10x  10"            LAQ R  4# 30 4# x30 4#30x            Strap R H/S stretch supine  20" 5 20"x5 20x5"            Bridge with ball adduction  5" 20 5" 20 5" 20x            SAQ R  4# 30 4# x30 4#30x             Wedge calf stretch R 20" 5  30"x3 30"x3                                                                                   Modalities                      CP R knee

## 2019-09-03 ENCOUNTER — OFFICE VISIT (OUTPATIENT)
Dept: PHYSICAL THERAPY | Facility: CLINIC | Age: 54
End: 2019-09-03
Payer: COMMERCIAL

## 2019-09-03 DIAGNOSIS — M17.11 ARTHRITIS OF RIGHT KNEE: Primary | ICD-10-CM

## 2019-09-03 PROCEDURE — 97112 NEUROMUSCULAR REEDUCATION: CPT | Performed by: PHYSICAL THERAPIST

## 2019-09-03 NOTE — PROGRESS NOTES
Daily Note     Today's date: 9/3/2019  Patient name: Abdoul Willams  : 1965  MRN: [id number]  Referring provider: Peri Hopson MD  Dx:   Encounter Diagnosis     ICD-10-CM    1  Arthritis of right knee M17 11        Start Time: 0950  Stop Time: 1037  Total time in clinic (min): 47 minutes    Subjective: R knee is very painful today      Objective: See treatment diary below      Assessment: Tolerated treatment well  Reported feeling better with exercises, no pain at end of session  Patient would benefit from continued PT      Plan: Continue per plan of care        Daily Treatment Record:    POC expires: 19  PRECAUTIONS: NONE  PMH: UNREMARKABLE        Manual  8/22 8/27 8/29 9/3           R knee PROM  2' 2' 3' 3'           R patellar mobs sup/inf  3' 3' 3'            R H/S stretch  3' 3'             R tib-fem mobs    2' 2'                 Exercise Diary  8/22 8/27 8/29 9/3           Bike  L3 10' L3 10' L3 10' L3 10'           Mini Lunge  10 R 10 R 10xR 10x R           Supine R straight leg  4# 3x10 4# 3x10 4# 3x10 4# 3x10           Lat stepping in squat at ll bars  5 laps blue TB 5 laps  Blue  TB 5 laps BTB 5 laps BTB           Step overs up R, down L  8" 20 8"  x20 8" 20x 20x           8" step stretch R knee flex  10" 10 10"x10 10x  10" 10x10"           LAQ R  4# 30 4# x30 4#30x 4# 30x           Strap R H/S stretch supine  20" 5 20"x5 20"x5 20"x5           Bridge with ball adduction  5" 20 5" 20 5" 20x 5" 20x           SAQ R  4# 30 4# x30 4#30x 4# 30x            Wedge calf stretch R 20" 5  30"x3 30"x3 30"x3                                                                                  Modalities                      CP R knee

## 2019-09-05 ENCOUNTER — OFFICE VISIT (OUTPATIENT)
Dept: PHYSICAL THERAPY | Facility: CLINIC | Age: 54
End: 2019-09-05
Payer: COMMERCIAL

## 2019-09-05 DIAGNOSIS — M17.11 ARTHRITIS OF RIGHT KNEE: Primary | ICD-10-CM

## 2019-09-05 PROCEDURE — 97112 NEUROMUSCULAR REEDUCATION: CPT | Performed by: PHYSICAL THERAPIST

## 2019-09-05 PROCEDURE — 97110 THERAPEUTIC EXERCISES: CPT | Performed by: PHYSICAL THERAPIST

## 2019-09-05 NOTE — PROGRESS NOTES
Daily Note     Today's date: 2019  Patient name: Margoth Tamayo  : 1965  MRN: [id number]  Referring provider: Angelica Love MD  Dx:   Encounter Diagnosis     ICD-10-CM    1  Arthritis of right knee M17 11        Start Time: 945  Stop Time: 102  Total time in clinic (min): 44 minutes    Subjective: states that knee feels good some days and bothers him other days but does not have the severe pain and swelling that kept him from walking    Objective: See treatment diary below    Assessment: Tolerated treatment well but continues to be challenged by SLR  PROM knee flexion or extension is pain-free      Plan:   Is cancelling all but the next appointment due to schedule conflict with work; DEANNA taylor      Daily Treatment Record:    POC expires: 19  PRECAUTIONS: NONE  PMH: UNREMARKABLE        Manual  8/22 8/27 8/29 9/3 9/5          R knee PROM  2' 2' 3' 3' 3'          R patellar mobs sup/inf  3' 3' 3'  1'          R H/S stretch  3' 3'             R tib-fem mobs    2' 2' 1'                Exercise Diary  8/22 8/27 8/29 9/3 9/5          Bike  L3 10' L3 10' L3 10' L3 10' L310"          Mini Lunge  10 R 10 R 10xR 10x R 10xR          Supine R straight leg  4# 3x10 4# 3x10 4# 3x10 4# 3x10 4# 3x10          Lat stepping in squat at ll bars  5 laps blue TB 5 laps  Blue  TB 5 laps BTB 5 laps BTB 5 laps BTB          Step overs up R, down L  8" 20 8"  x20 8" 20x 20x 20x          8" step stretch R knee flex  10" 10 10"x10 10x  10" 10x10" 10x10"          LAQ R  4# 30 4# x30 4#30x 4# 30x # 30x          Strap R H/S stretch supine  20" 5 20"x5 20"x5 20"x5 20"x5          Bridge with ball adduction  5" 20 5" 20 5" 20x 5" 20x 20x 5"          SAQ R  4# 30 4# x30 4#30x 4# 30x 4# 30           Wedge calf stretch R 20" 5  30"x3 30"x3 30"x3 30"x3                                                                                 Modalities                      CP R knee

## 2019-09-09 ENCOUNTER — TELEPHONE (OUTPATIENT)
Dept: OBGYN CLINIC | Facility: HOSPITAL | Age: 54
End: 2019-09-09

## 2019-09-09 NOTE — TELEPHONE ENCOUNTER
Patient's wife Austin Koenig called in and would like to speak to the PA regarding the conversation at patient's last appointment  Patient is still having pain and called out of work again because he is not able to put pressure on the right knee  She would like to go forward with a MRI since Phy Therapy and the brace is not helping  Please give her a call at earliest convenience      CB[address] [phone] [facility]

## 2019-09-10 ENCOUNTER — EVALUATION (OUTPATIENT)
Dept: PHYSICAL THERAPY | Facility: CLINIC | Age: 54
End: 2019-09-10
Payer: COMMERCIAL

## 2019-09-10 DIAGNOSIS — S83.411A SPRAIN OF MEDIAL COLLATERAL LIGAMENT OF RIGHT KNEE, INITIAL ENCOUNTER: Primary | ICD-10-CM

## 2019-09-10 DIAGNOSIS — M17.11 ARTHRITIS OF RIGHT KNEE: Primary | ICD-10-CM

## 2019-09-10 PROCEDURE — 97112 NEUROMUSCULAR REEDUCATION: CPT | Performed by: PHYSICAL THERAPIST

## 2019-09-10 PROCEDURE — 97110 THERAPEUTIC EXERCISES: CPT | Performed by: PHYSICAL THERAPIST

## 2019-09-10 NOTE — PROGRESS NOTES
PT Evaluation     Today's date: 9/10/2019  Patient name: Jose Goldberg  : 1965  MRN: [id number]  Referring provider: Fredrick Samuels MD  Dx:   Encounter Diagnosis     ICD-10-CM    1  Arthritis of right knee M17 11                   ASSESSMENT/PLAN:  Patient presented to [facility] with R knee pain causing decreased functional mobility  Patient has improved significantly, but still has days with significant pain in R knee  Pt needs ot keep his knee strong to limit functional loss  He is not going to be able to attend PT any more due to his work schedule  He has met his goals and is independent with his HEP  Frequency/Duration: D/C    STG:(2 weeks)  1) Patient R LE strength will be WNL and pain free - Met  2) Patient pain at worst will be 6/10 or less - Met  3) Patient right knee flexion PROM will be >120 degrees - Met  4) Patient will step down from 6" step leading w/ L, pain <4/10 - Met    LTG:(4 weeks)  1) Patient pain at worst will be 3/10 or less - Mostly Met  2) Patient R knee flexion PROM will be within 3 degrees of L knee - Met  3) Patient will ascend/descend flight of stairs step over step w/ pain <3/10 - Met  4) Patient will be able to walk 2 blocks w/ no difficulty - Met  5) No tightness R LE in 4 weeks - Met  6) FOTO = 59/100 in 4 weeks - Met      SUBJECTIVE:  Patient reports having pain in the medial side of right knee for about one year, mostly when he bends it  That has improved with PT, but still has times when R knee is very painful without cause  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[employer], 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  When the pain comes on it feels better to straighten the knee  Pain is worse in the morning, feels better as he starts to move it around but hurts again at the end of the day  Feeling better recently        Pain levels:    Best- 0/10   Worst- 410    OBJECTIVE:      Palpation: Mild tenderness to palpation medial knee    Gait: WNL    Special tests:    Varus stress - R brought on medial knee pain   Valgus stress - normal bilateral    Patellar Mobility   WNL all directions, superior and inferior glides caused some pain and discomfort  Hip      Strength       AROM    PROM                R               L                  R               L             R             L   Abduction 5/5 5/5       Flexion 5/5 5/5   WNL WNL   Extension 5/5 5/5   WNL WNL   ER 5/5 5/5   WNL WNL   IR 5/5 5/5       Adduction 5/5 5/5                  Knee/Ankle      Strength       AROM    PROM                R               L                  R               L             R             L   Flexion 5/5 5/5   112*  129   Extension 5/5 5/5   0 0   PF         DF 5/5 5/5       Inversion 5/5 5/5       Eversion 5/5 5/5                           Neruo screen:   Sensation - intact to light touch   Strength - vinny  LE strength WNL   Reflexes - WNL vinny   Achilles/quad                   Daily Treatment Record:    POC expires: 9/19/19  PRECAUTIONS: NONE  PMH: UNREMARKABLE        Manual  8/22 8/27 8/29 9/3 9/5  9/10           R knee PROM  2' 2' 3' 3' 3'  3'           R patellar mobs sup/inf  3' 3' 3'   1'  1'           R H/S stretch  3' 3'                   R tib-fem mobs     2' 2' 1'                   Exercise Diary  8/22 8/27 8/29 9/3 9/5  9/10           Bike  L3 10' L3 10' L3 10' L3 10' L310"  L3 10'           Mini Lunge  10 R 10 R 10xR 10x R 10xR  10 R           Supine R straight leg  4# 3x10 4# 3x10 4# 3x10 4# 3x10 4# 3x10  4# 30           Lat stepping in squat at ll bars  5 laps blue TB 5 laps  Blue  TB 5 laps BTB 5 laps BTB 5 laps BTB  BTB 5 laps           Step overs up R, down L  8" 20 8"  x20 8" 20x 20x 20x  8" 20           8" step stretch R knee flex  10" 10 10"x10 10x  10" 10x10" 10x10"  10" 10           LAQ R  4# 30 4# x30 4#30x 4# 30x # 30x  4# 30           Strap R H/S stretch supine  20" 5 20"x5 20"x5 20"x5 20"x5  20" 5           Bridge with ball adduction  5" 20 5" 20 5" 20x 5" 20x 20x 5" 5" 20           SAQ R  4# 30 4# x30 4#30x 4# 30x 4# 30  4# 30            Wedge calf stretch R 20" 5  30"x3 30"x3 30"x3 30"x3  30" 3                                                                                          Modalities                        CP R knee

## 2019-09-10 NOTE — TELEPHONE ENCOUNTER
Called and s/w wife - she is going to schedule this & plan to do it before appt with you on 09/27    Thank you!

## 2019-09-12 ENCOUNTER — APPOINTMENT (OUTPATIENT)
Dept: PHYSICAL THERAPY | Facility: CLINIC | Age: 54
End: 2019-09-12
Payer: COMMERCIAL

## 2019-09-17 ENCOUNTER — APPOINTMENT (OUTPATIENT)
Dept: PHYSICAL THERAPY | Facility: CLINIC | Age: 54
End: 2019-09-17
Payer: COMMERCIAL

## 2019-09-18 ENCOUNTER — HOSPITAL ENCOUNTER (OUTPATIENT)
Dept: RADIOLOGY | Age: 54
Discharge: HOME | End: 2019-09-18
Payer: COMMERCIAL

## 2019-09-18 DIAGNOSIS — S83.411A SPRAIN OF MEDIAL COLLATERAL LIGAMENT OF RIGHT KNEE, INITIAL ENCOUNTER: ICD-10-CM

## 2019-09-18 PROCEDURE — 73721 MRI JNT OF LWR EXTRE W/O DYE: CPT

## 2019-09-25 ENCOUNTER — TELEPHONE (OUTPATIENT)
Dept: OBGYN CLINIC | Facility: HOSPITAL | Age: 54
End: 2019-09-25

## 2019-09-25 NOTE — TELEPHONE ENCOUNTER
Zuleyma Wellington called back to check on the status of the requested note        I faxed the work note to Zuleyma Wellington at [phone]

## 2019-09-25 NOTE — TELEPHONE ENCOUNTER
I spoke to Austin Waterville (wife) I advised Mayra Benito should see Dr Street Listen to review the MRI and discuss treatment options  She asked if patient has a tear I read the MRI report with shows a Meniscus Tear  She would like to know if Mayra Benito can get a work note to keep him out of work until he comes [facility]  Patient works 12hr shift standing on concrete and kneeling at times       Fax [phone]

## 2019-09-25 NOTE — TELEPHONE ENCOUNTER
Orrie Mcardle Dr Willia Norman    Wife would like call back before rescheduling appt  She would like to know why it is being changed

## 2019-10-01 ENCOUNTER — OFFICE VISIT (OUTPATIENT)
Dept: OBGYN CLINIC | Facility: HOSPITAL | Age: 54
End: 2019-10-01
Payer: COMMERCIAL

## 2019-10-01 VITALS
HEIGHT: 69 IN | SYSTOLIC BLOOD PRESSURE: 136 MMHG | HEART RATE: 95 BPM | BODY MASS INDEX: 39.69 KG/M2 | DIASTOLIC BLOOD PRESSURE: 77 MMHG | WEIGHT: 268 LBS

## 2019-10-01 DIAGNOSIS — M17.11 ARTHRITIS OF RIGHT KNEE: Primary | ICD-10-CM

## 2019-10-01 PROCEDURE — 99213 OFFICE O/P EST LOW 20 MIN: CPT | Performed by: ORTHOPAEDIC SURGERY

## 2019-10-01 NOTE — PROGRESS NOTES
49 y o male  Here for follow-up of right medially based knee pain  Pain is worse with activity and weight-bearing and is relieved with rest   Pain is intermittent in nature  Wearing a brace did help with his pain  He did receive a cortisone injection previously which did not significantly improve his pain  He denies any locking or catching of the right knee    Review of Systems  Review of systems negative unless otherwise specified in HPI    Past Medical History  Past Medical History:   Diagnosis Date    Acute suppurative otitis media of both ears without spontaneous rupture of tympanic membranes 9/2/2016    Arthritis     Other tenosynovitis of hand and wrist 9/26/2013       Past Surgical History  History reviewed  No pertinent surgical history  Current Medications  Current Outpatient Medications on File Prior to Visit   Medication Sig Dispense Refill    Acetaminophen (TYLENOL ARTHRITIS EXT RELIEF PO) Take by mouth      diclofenac sodium (VOLTAREN) 1 % Apply 2 g topically 4 (four) times a day 1 Tube 1    famotidine (PEPCID) 10 mg tablet Take 10 mg by mouth 2 (two) times a day       No current facility-administered medications on file prior to visit  Recent Labs [facility])  0   Lab Value Date/Time    HCT 51 1 (H) 06/04/2019 1741    HGB 16 9 06/04/2019 1741    WBC 7 89 06/04/2019 1741    CRP <3 0 06/04/2019 1742    HGBA1C 6 1 07/29/2017 0902    HGBA1C 5 7 (H) 08/01/2015 0807         Physical exam  · General: Awake, Alert, Oriented  · Eyes: Pupils equal, round and reactive to light  · Heart: regular rate and rhythm  · Lungs: No audible wheezing  · Abdomen: soft    Right lower extremity:   Range of motion of the knee is full in terms of knee flexion and extension  Tenderness to palpation over the medial joint line    Stable varus and valgus stress in full extension and 30° mid flexion    Imaging   MRI of the right knee shows a complex posterior horn medial meniscus tear    Procedure   none    Assessment/Plan:   49 y o male  Right posterior  Horn medial meniscus tear degenerative in nature  At this point time, we will manage this non operatively with continued home therapy exercises, intermittent corticosteroid injection, and  Bracing    Plans as follows:    Weightbearing as tolerated right lower extremity  Use brace as needed   recommend taking arthritis Tylenol 2 pills in the morning and 2 pills at night  Follow-up in 4 weeks

## 2019-10-01 NOTE — LETTER
October 1, 2019     Patient: Serjio Meza   YOB: 1965   Date of Visit: 10/1/2019       To Whom it May Concern:    Faraz Sherwood is under my professional care  He was seen in my office on 10/1/2019  He may return to work on 10/14/2019 without restrictions  If you have any questions or concerns, please don't hesitate to call           Sincerely,          Chary Gastelum MD        CC: No Recipients

## 2019-11-05 ENCOUNTER — OFFICE VISIT (OUTPATIENT)
Dept: OBGYN CLINIC | Facility: HOSPITAL | Age: 54
End: 2019-11-05
Payer: COMMERCIAL

## 2019-11-05 VITALS
BODY MASS INDEX: 39.55 KG/M2 | SYSTOLIC BLOOD PRESSURE: 129 MMHG | HEIGHT: 69 IN | WEIGHT: 267 LBS | DIASTOLIC BLOOD PRESSURE: 86 MMHG | HEART RATE: 76 BPM

## 2019-11-05 DIAGNOSIS — M17.11 ARTHRITIS OF RIGHT KNEE: Primary | ICD-10-CM

## 2019-11-05 PROCEDURE — 99213 OFFICE O/P EST LOW 20 MIN: CPT | Performed by: ORTHOPAEDIC SURGERY

## 2019-11-05 NOTE — PROGRESS NOTES
Patient Name:  Claudell Alamin  MRN:  [id number]    Assessment & Plan     Right posterior horn medial meniscus tear, degenerative in nature, right knee OA  1  Discussed a repeat CSI today, Nessa Cramer declined at this time  2  He may undergo the CSI every 3 months as needed for pain   3  Continue bracing and HEP   4  Follow up as needed if symptoms worsen or fail to improve       Subjective     49 y o  male presents to the office today for a follow up regarding right knee pain, secondary to degenerative meniscus tear and OA  Nessa Cramer received a right knee CSI on 07/18/19  He has also been wearing a knee brace and performing a PT HEP  Overall Nessa Cramer is doing well  He notes that he is only experiencing a sharp pain to his right knee every once in a while  He states his symptoms have greatly reduced since his last visit  Nessa Cramer also states that his knee edema has resolved  He is not taking anything for pain control at this time  General ROS:  Negative for fever or chills  Neurological ROS:  Negative for numbness or tingling  Objective     /86   Pulse 76   Ht 5' 9" (1 753 m)   Wt 121 kg (267 lb)   BMI 39 43 kg/m²     Right knee:   Ambulates without assistance   No effusion   No erythema  No ecchymosis   No edema   Full ROM  No tenderness   Sensation intact to light touch   Skin warm and well perfused     Psychiatric: Mood and affect are appropriate  Data Review     I have personally reviewed pertinent films in PACS, and my interpretation follows      No new imaging to review       Social History     Tobacco Use    Smoking status: Current Every Day Smoker    Smokeless tobacco: Never Used   Substance Use Topics    Alcohol use: Yes     Frequency: Monthly or less    Drug use: Never       Scribe Attestation    I,:   Andrei Salmeron am acting as a scribe while in the presence of the attending physician :        I,:   Wiley Alvarado MD personally performed the services described in this documentation    as scribed in my presence :

## 2019-11-05 NOTE — LETTER
November 5, 2019     Patient: Demarcus Stephenson   YOB: 1965   Date of Visit: 11/5/2019       To Whom it May Concern:    Carlosdanae Milana is under my professional care  He was seen in my office on 11/5/2019  If you have any questions or concerns, please don't hesitate to call           Sincerely,          Lizabeth Lutz MD

## 2022-01-19 ENCOUNTER — TELEMEDICINE (OUTPATIENT)
Dept: FAMILY MEDICINE CLINIC | Facility: CLINIC | Age: 57
End: 2022-01-19
Payer: COMMERCIAL

## 2022-01-19 DIAGNOSIS — J98.8 VIRAL RESPIRATORY ILLNESS: Primary | ICD-10-CM

## 2022-01-19 DIAGNOSIS — B97.89 VIRAL RESPIRATORY ILLNESS: Primary | ICD-10-CM

## 2022-01-19 PROCEDURE — 99213 OFFICE O/P EST LOW 20 MIN: CPT | Performed by: FAMILY MEDICINE

## 2022-01-19 NOTE — PROGRESS NOTES
COVID-19 Outpatient Progress Note    Assessment/Plan:    Problem List Items Addressed This Visit        Respiratory    Viral respiratory illness - Primary     The patient is a 36-year-old male who presents today virtually for concerns over viral infection  He states that he began last Friday with a cough  He has had congestion and fevers feeling  He has had some mild chest tightness and feeling of shortness of breath  Overall he feels like he is improving  He does not have a thermometer and is not able to check his temperature  We discussed his condition  He is going to try to obtain a pulse oximeter, thermometer and vitamin-D 2000 units daily push fluids and rest   I told him that from my standpoint I did not feel that COVID 19 testing was presently necessary  He is already on day 6  He is going to call back with work requirements in regard to [place] testing  I told him that he should remain out of work for now as he remains significantly symptomatic  He is asked call back if he has any significant deterioration of his condition, worsening shortness of breath, chest tightness X cetera  Additionally he is asked to seek more urgent medical attention as he deems necessary  If he is able to get a pulse oximeter he is going to call back with his results  I also offered for him to come to the office for a oximeter reading if he cannot obtain 1  He agrees with this plan  Disposition:     Patient is not fully vaccinated and I recommended self quarantine for 5 days followed by strict mask use for an additional 5 days  If patient were to develop symptoms, they should immediately self isolate and call our office for further guidance  I have spent 20 minutes directly with the patient  Greater than 50% of this time was spent in counseling/coordination of care regarding: prognosis, risks and benefits of treatment options, instructions for management and impressions        Encounter provider Luis Alberto Fox Christiano Mckeon MD    Provider located at [address] [address]    Recent Visits  No visits were found meeting these conditions  Showing recent visits within past 7 days and meeting all other requirements  Today's Visits  Date Type Provider Dept   01/19/22 Telemedicine Sandra Ponce MD [facility]   Showing today's visits and meeting all other requirements  Future Appointments  No visits were found meeting these conditions  Showing future appointments within next 150 days and meeting all other requirements     This virtual check-in was done via [employer] and patient was informed that this is a secure, HIPAA-compliant platform  He agrees to proceed  Patient agrees to participate in a virtual check in via telephone or video visit instead of presenting to the office to address urgent/immediate medical needs  Patient is aware this is a billable service  After connecting through [facility], the patient was identified by name and date of birth  Butler Cabot was informed that this was a telemedicine visit and that the exam was being conducted confidentially over secure lines  My office door was closed  No one else was in the room  Butler Cabot acknowledged consent and understanding of privacy and security of the telemedicine visit  I informed the patient that I have reviewed his record in Epic and presented the opportunity for him to ask any questions regarding the visit today  The patient agreed to participate  Verification of patient location:  Patient is located in the following state in which I hold an active license: [place]    Subjective:   Butler Cabot is a 55 y o  male who is concerned about COVID-19  Patient's symptoms include chills, fatigue, nasal congestion, sore throat, anosmia, cough, shortness of breath, chest tightness, diarrhea, myalgias and headache   Patient denies loss of taste, nausea and vomiting      - Date of symptom onset: 1/14/2022      COVID-19 vaccination status: Not vaccinated    The patient is a 69-year-old unvaccinated male smoker who presents today with a complaint of cough productive of thick sputum for several days  He states that he was at work on Friday and developed a cough  He had a feverish feeling though no thermometer  That his subsequently resolved  He continues with nasal congestion and sore throat  He had some diarrhea though this resolved  He believes that could have been from some Mucinex he has been taking  He had a mild headache though that has improved  He has no myalgias  He does have diminished smell but he believes that is chronic  He has some degree of fatigue  He has a mild feeling of chest tightness and shortness of breath though does not appear to be in any respiratory distress based on his virtual examination  He has been out of work since Monday  He is unclear as to work requirements in regard to COVID-19 testing  He notes that there have been multiple cases of COVID-19 at his workplace though he was on vacation when these were reported  He had been back to work for 2 weeks prior to his absence beginning Monday  No results found for: [address] [facility] [address], [address] [facility], [place], [place], [facility]  Past Medical History:   Diagnosis Date    Acute suppurative otitis media of both ears without spontaneous rupture of tympanic membranes 9/2/2016    Arthritis     Other tenosynovitis of hand and wrist 9/26/2013     No past surgical history on file    Current Outpatient Medications   Medication Sig Dispense Refill    Acetaminophen (TYLENOL ARTHRITIS EXT RELIEF PO) Take by mouth      diclofenac sodium (VOLTAREN) 1 % Apply 2 g topically 4 (four) times a day (Patient not taking: Reported on 1/19/2022 ) 1 Tube 1    famotidine (PEPCID) 10 mg tablet Take 10 mg by mouth 2 (two) times a day (Patient not taking: Reported on 1/19/2022 )       No current facility-administered medications for this visit  No Known Allergies    Review of Systems   Constitutional: Positive for chills and fatigue  HENT: Positive for congestion and sore throat  Respiratory: Positive for cough, chest tightness and shortness of breath  Gastrointestinal: Positive for diarrhea  Negative for nausea and vomiting  Musculoskeletal: Positive for myalgias  Neurological: Positive for headaches  Objective: There were no vitals filed for this visit  Physical Exam  Constitutional:       General: He is not in acute distress  Appearance: He is ill-appearing  He is not toxic-appearing  HENT:      Nose: Congestion present  Comments: Audible congestion noted during virtual examination  Pulmonary:      Effort: Pulmonary effort is normal  No respiratory distress  Comments: Occasional productive sounding cough noted during virtual examination  No evidence of respiratory distress noted  Neurological:      Mental Status: He is alert and oriented to person, place, and time  Psychiatric:         Mood and Affect: Mood normal          VIRTUAL VISIT DISCLAIMER    David Redman verbally agrees to participate in [employer]  Pt is aware that [employer] could be limited without vital signs or the ability to perform a full hands-on physical exam  Uriah Emanuel understands he or the provider may request at any time to terminate the video visit and request the patient to seek care or treatment in person

## 2022-01-19 NOTE — LETTER
January 20, 2022     Patient: Butler Cabot   YOB: 1965   Date of Visit: 1/19/2022       To Whom it May Concern:    Sim Ram is under my professional care  He was seen in my office on 1/19/2022  He may return to work on 1/21/22  Please excused him from work on 1/17 & 1/8/22 also  If you have any questions or concerns, please don't hesitate to call           Sincerely,          Sandra Ponce MD        CC: No Recipients

## 2022-01-19 NOTE — ASSESSMENT & PLAN NOTE
The patient is a 67-year-old male who presents today virtually for concerns over viral infection  He states that he began last Friday with a cough  He has had congestion and fevers feeling  He has had some mild chest tightness and feeling of shortness of breath  Overall he feels like he is improving  He does not have a thermometer and is not able to check his temperature  We discussed his condition  He is going to try to obtain a pulse oximeter, thermometer and vitamin-D 2000 units daily push fluids and rest   I told him that from my standpoint I did not feel that COVID 19 testing was presently necessary  He is already on day 6  He is going to call back with work requirements in regard to [place] testing  I told him that he should remain out of work for now as he remains significantly symptomatic  He is asked call back if he has any significant deterioration of his condition, worsening shortness of breath, chest tightness X cetera  Additionally he is asked to seek more urgent medical attention as he deems necessary  If he is able to get a pulse oximeter he is going to call back with his results  I also offered for him to come to the office for a oximeter reading if he cannot obtain 1  He agrees with this plan

## 2022-07-25 ENCOUNTER — HOSPITAL ENCOUNTER (EMERGENCY)
Facility: HOSPITAL | Age: 57
Discharge: HOME | End: 2022-07-25
Attending: EMERGENCY MEDICINE
Payer: COMMERCIAL

## 2022-07-25 ENCOUNTER — APPOINTMENT (EMERGENCY)
Dept: RADIOLOGY | Facility: HOSPITAL | Age: 57
End: 2022-07-25
Payer: COMMERCIAL

## 2022-07-25 VITALS
RESPIRATION RATE: 22 BRPM | TEMPERATURE: 100.9 F | SYSTOLIC BLOOD PRESSURE: 139 MMHG | OXYGEN SATURATION: 93 % | HEART RATE: 76 BPM | BODY MASS INDEX: 39.55 KG/M2 | WEIGHT: 267 LBS | DIASTOLIC BLOOD PRESSURE: 65 MMHG | HEIGHT: 69 IN

## 2022-07-25 DIAGNOSIS — J40 BRONCHITIS: ICD-10-CM

## 2022-07-25 DIAGNOSIS — R51.9 ACUTE HEADACHE: ICD-10-CM

## 2022-07-25 DIAGNOSIS — U07.1 UPPER RESPIRATORY TRACT INFECTION DUE TO COVID-19 VIRUS: Primary | ICD-10-CM

## 2022-07-25 DIAGNOSIS — R11.0 NAUSEA: ICD-10-CM

## 2022-07-25 DIAGNOSIS — J06.9 UPPER RESPIRATORY TRACT INFECTION DUE TO COVID-19 VIRUS: Primary | ICD-10-CM

## 2022-07-25 LAB
ALBUMIN SERPL BCP-MCNC: 3.6 G/DL (ref 3.5–5)
ALP SERPL-CCNC: 61 U/L (ref 46–116)
ALT SERPL W P-5'-P-CCNC: 30 U/L (ref 12–78)
ANION GAP SERPL CALCULATED.3IONS-SCNC: 10 MMOL/L (ref 4–13)
APTT PPP: 26 SECONDS (ref 23–37)
AST SERPL W P-5'-P-CCNC: 19 U/L (ref 5–45)
BASOPHILS # BLD AUTO: 0.03 THOUSANDS/ΜL (ref 0–0.1)
BASOPHILS NFR BLD AUTO: 1 % (ref 0–1)
BILIRUB SERPL-MCNC: 0.5 MG/DL (ref 0.2–1)
BUN SERPL-MCNC: 11 MG/DL (ref 5–25)
CALCIUM SERPL-MCNC: 9 MG/DL (ref 8.3–10.1)
CARDIAC TROPONIN I PNL SERPL HS: 4 NG/L
CHLORIDE SERPL-SCNC: 100 MMOL/L (ref 96–108)
CO2 SERPL-SCNC: 27 MMOL/L (ref 21–32)
CREAT SERPL-MCNC: 1.16 MG/DL (ref 0.6–1.3)
EOSINOPHIL # BLD AUTO: 0.04 THOUSAND/ΜL (ref 0–0.61)
EOSINOPHIL NFR BLD AUTO: 1 % (ref 0–6)
ERYTHROCYTE [DISTWIDTH] IN BLOOD BY AUTOMATED COUNT: 13.6 % (ref 11.6–15.1)
GFR SERPL CREATININE-BSD FRML MDRD: 70 ML/MIN/1.73SQ M
GLUCOSE SERPL-MCNC: 104 MG/DL (ref 65–140)
HCT VFR BLD AUTO: 47.4 % (ref 36.5–49.3)
HGB BLD-MCNC: 15.5 G/DL (ref 12–17)
IMM GRANULOCYTES # BLD AUTO: 0.02 THOUSAND/UL (ref 0–0.2)
IMM GRANULOCYTES NFR BLD AUTO: 0 % (ref 0–2)
INR PPP: 0.95 (ref 0.84–1.19)
LACTATE SERPL-SCNC: 1.4 MMOL/L (ref 0.5–2)
LYMPHOCYTES # BLD AUTO: 0.27 THOUSANDS/ΜL (ref 0.6–4.47)
LYMPHOCYTES NFR BLD AUTO: 4 % (ref 14–44)
MCH RBC QN AUTO: 31 PG (ref 26.8–34.3)
MCHC RBC AUTO-ENTMCNC: 32.7 G/DL (ref 31.4–37.4)
MCV RBC AUTO: 95 FL (ref 82–98)
MONOCYTES # BLD AUTO: 1.06 THOUSAND/ΜL (ref 0.17–1.22)
MONOCYTES NFR BLD AUTO: 17 % (ref 4–12)
NEUTROPHILS # BLD AUTO: 4.72 THOUSANDS/ΜL (ref 1.85–7.62)
NEUTS SEG NFR BLD AUTO: 77 % (ref 43–75)
NRBC BLD AUTO-RTO: 0 /100 WBCS
PLATELET # BLD AUTO: 215 THOUSANDS/UL (ref 149–390)
PMV BLD AUTO: 9.2 FL (ref 8.9–12.7)
POTASSIUM SERPL-SCNC: 3.9 MMOL/L (ref 3.5–5.3)
PROCALCITONIN SERPL-MCNC: 0.17 NG/ML
PROT SERPL-MCNC: 7.5 G/DL (ref 6.4–8.4)
PROTHROMBIN TIME: 13.3 SECONDS (ref 11.6–14.5)
RBC # BLD AUTO: 5 MILLION/UL (ref 3.88–5.62)
SARS-COV-2 RNA RESP QL NAA+PROBE: POSITIVE
SODIUM SERPL-SCNC: 137 MMOL/L (ref 135–147)
WBC # BLD AUTO: 6.14 THOUSAND/UL (ref 4.31–10.16)

## 2022-07-25 PROCEDURE — U0005 INFEC AGEN DETEC AMPLI PROBE: HCPCS | Performed by: EMERGENCY MEDICINE

## 2022-07-25 PROCEDURE — 96365 THER/PROPH/DIAG IV INF INIT: CPT

## 2022-07-25 PROCEDURE — 36415 COLL VENOUS BLD VENIPUNCTURE: CPT | Performed by: EMERGENCY MEDICINE

## 2022-07-25 PROCEDURE — 96375 TX/PRO/DX INJ NEW DRUG ADDON: CPT

## 2022-07-25 PROCEDURE — 71045 X-RAY EXAM CHEST 1 VIEW: CPT

## 2022-07-25 PROCEDURE — 84145 PROCALCITONIN (PCT): CPT | Performed by: EMERGENCY MEDICINE

## 2022-07-25 PROCEDURE — 99285 EMERGENCY DEPT VISIT HI MDM: CPT

## 2022-07-25 PROCEDURE — 85610 PROTHROMBIN TIME: CPT | Performed by: EMERGENCY MEDICINE

## 2022-07-25 PROCEDURE — 87040 BLOOD CULTURE FOR BACTERIA: CPT | Performed by: EMERGENCY MEDICINE

## 2022-07-25 PROCEDURE — 84484 ASSAY OF TROPONIN QUANT: CPT | Performed by: EMERGENCY MEDICINE

## 2022-07-25 PROCEDURE — U0003 INFECTIOUS AGENT DETECTION BY NUCLEIC ACID (DNA OR RNA); SEVERE ACUTE RESPIRATORY SYNDROME CORONAVIRUS 2 (SARS-COV-2) (CORONAVIRUS DISEASE [COVID-19]), AMPLIFIED PROBE TECHNIQUE, MAKING USE OF HIGH THROUGHPUT TECHNOLOGIES AS DESCRIBED BY CMS-2020-01-R: HCPCS | Performed by: EMERGENCY MEDICINE

## 2022-07-25 PROCEDURE — 85730 THROMBOPLASTIN TIME PARTIAL: CPT | Performed by: EMERGENCY MEDICINE

## 2022-07-25 PROCEDURE — 83605 ASSAY OF LACTIC ACID: CPT | Performed by: EMERGENCY MEDICINE

## 2022-07-25 PROCEDURE — 99285 EMERGENCY DEPT VISIT HI MDM: CPT | Performed by: EMERGENCY MEDICINE

## 2022-07-25 PROCEDURE — 93005 ELECTROCARDIOGRAM TRACING: CPT

## 2022-07-25 PROCEDURE — 85025 COMPLETE CBC W/AUTO DIFF WBC: CPT | Performed by: EMERGENCY MEDICINE

## 2022-07-25 PROCEDURE — 80053 COMPREHEN METABOLIC PANEL: CPT | Performed by: EMERGENCY MEDICINE

## 2022-07-25 RX ORDER — ALBUTEROL SULFATE 90 UG/1
1-2 AEROSOL, METERED RESPIRATORY (INHALATION) EVERY 6 HOURS PRN
Qty: 8 G | Refills: 0 | Status: SHIPPED | OUTPATIENT
Start: 2022-07-25

## 2022-07-25 RX ORDER — ALBUTEROL SULFATE 90 UG/1
2 AEROSOL, METERED RESPIRATORY (INHALATION) ONCE
Status: COMPLETED | OUTPATIENT
Start: 2022-07-25 | End: 2022-07-25

## 2022-07-25 RX ORDER — MAGNESIUM SULFATE HEPTAHYDRATE 40 MG/ML
2 INJECTION, SOLUTION INTRAVENOUS ONCE
Status: COMPLETED | OUTPATIENT
Start: 2022-07-25 | End: 2022-07-25

## 2022-07-25 RX ORDER — DIPHENHYDRAMINE HYDROCHLORIDE 50 MG/ML
25 INJECTION INTRAMUSCULAR; INTRAVENOUS ONCE
Status: COMPLETED | OUTPATIENT
Start: 2022-07-25 | End: 2022-07-25

## 2022-07-25 RX ORDER — ONDANSETRON 4 MG/1
4 TABLET, ORALLY DISINTEGRATING ORAL EVERY 8 HOURS PRN
Qty: 12 TABLET | Refills: 0 | Status: SHIPPED | OUTPATIENT
Start: 2022-07-25

## 2022-07-25 RX ORDER — METOCLOPRAMIDE HYDROCHLORIDE 5 MG/ML
10 INJECTION INTRAMUSCULAR; INTRAVENOUS ONCE
Status: COMPLETED | OUTPATIENT
Start: 2022-07-25 | End: 2022-07-25

## 2022-07-25 RX ORDER — KETOROLAC TROMETHAMINE 30 MG/ML
15 INJECTION, SOLUTION INTRAMUSCULAR; INTRAVENOUS ONCE
Status: COMPLETED | OUTPATIENT
Start: 2022-07-25 | End: 2022-07-25

## 2022-07-25 RX ADMIN — SODIUM CHLORIDE 1000 ML: 0.9 INJECTION, SOLUTION INTRAVENOUS at 11:49

## 2022-07-25 RX ADMIN — ALBUTEROL SULFATE 2 PUFF: 90 AEROSOL, METERED RESPIRATORY (INHALATION) at 13:11

## 2022-07-25 RX ADMIN — METOCLOPRAMIDE HYDROCHLORIDE 10 MG: 5 INJECTION INTRAMUSCULAR; INTRAVENOUS at 11:52

## 2022-07-25 RX ADMIN — MAGNESIUM SULFATE HEPTAHYDRATE 2 G: 40 INJECTION, SOLUTION INTRAVENOUS at 11:53

## 2022-07-25 RX ADMIN — KETOROLAC TROMETHAMINE 15 MG: 30 INJECTION, SOLUTION INTRAMUSCULAR; INTRAVENOUS at 11:49

## 2022-07-25 RX ADMIN — DIPHENHYDRAMINE HYDROCHLORIDE 25 MG: 50 INJECTION, SOLUTION INTRAMUSCULAR; INTRAVENOUS at 11:51

## 2022-07-25 NOTE — ED PROVIDER NOTES
History  Chief Complaint   Patient presents with    Shortness of Breath     Patient presents to ED with shortness of breath that began last night around midnight, worse with coughing and deep breath  65year old male presents for evaluation of headache and shortness of breath associated with cough and congestion which have been present for the past 1-2 months  Patient states he developed subjective fevers last night as well  Headache is frontal, bilateral and radiates behind the eyes  He denies sick contacts  Patient has not been vaccinated against COVID  He believes he had COVID 6 months ago, but had not been tested  History provided by:  Patient  Shortness of Breath  Severity:  Moderate  Onset quality:  Gradual  Duration:  1 day  Timing:  Constant  Progression:  Worsening  Chronicity:  New  Relieved by:  Nothing  Worsened by:  Deep breathing and coughing  Ineffective treatments:  None tried  Associated symptoms: abdominal pain (cramping with cough), cough (productive of white sputum for 2 months), diaphoresis, fever (subjective) and headaches    Associated symptoms: no chest pain, no rash, no sore throat and no vomiting    Headaches:     Severity:  Severe    Onset quality:  Gradual    Duration:  1 day    Timing:  Constant    Progression:  Worsening    Chronicity:  New  Risk factors: tobacco use        Prior to Admission Medications   Prescriptions Last Dose Informant Patient Reported? Taking?    Acetaminophen (TYLENOL ARTHRITIS EXT RELIEF PO)   Yes No   Sig: Take by mouth   diclofenac sodium (VOLTAREN) 1 %   No No   Sig: Apply 2 g topically 4 (four) times a day   Patient not taking: Reported on 1/19/2022    famotidine (PEPCID) 10 mg tablet   Yes No   Sig: Take 10 mg by mouth 2 (two) times a day   Patient not taking: Reported on 1/19/2022       Facility-Administered Medications: None       Past Medical History:   Diagnosis Date    Acute suppurative otitis media of both ears without spontaneous rupture of tympanic membranes 9/2/2016    Arthritis     Other tenosynovitis of hand and wrist 9/26/2013       History reviewed  No pertinent surgical history  History reviewed  No pertinent family history  I have reviewed and agree with the history as documented  E-Cigarette/Vaping     E-Cigarette/Vaping Substances     Social History     Tobacco Use    Smoking status: Current Every Day Smoker    Smokeless tobacco: Never Used   Substance Use Topics    Alcohol use: Yes    Drug use: Never       Review of Systems   Constitutional: Positive for appetite change, chills, diaphoresis and fever (subjective)  HENT: Positive for congestion (for 1-2 months) and postnasal drip  Negative for sore throat  Respiratory: Positive for cough (productive of white sputum for 2 months) and shortness of breath  Cardiovascular: Negative for chest pain and leg swelling  Gastrointestinal: Positive for abdominal pain (cramping with cough) and nausea  Negative for constipation, diarrhea and vomiting  Musculoskeletal: Positive for myalgias  Skin: Negative for rash and wound  Neurological: Positive for headaches  Negative for dizziness and syncope  All other systems reviewed and are negative  Physical Exam  Physical Exam  Vitals and nursing note reviewed  Constitutional:       General: He is not in acute distress  Appearance: He is well-developed  He is not toxic-appearing or diaphoretic  HENT:      Head: Normocephalic and atraumatic  Right Ear: External ear normal       Left Ear: External ear normal       Nose: Nose normal    Eyes:      General: No scleral icterus  Cardiovascular:      Rate and Rhythm: Normal rate and regular rhythm  Heart sounds: Normal heart sounds  Pulmonary:      Effort: Pulmonary effort is normal  No respiratory distress  Breath sounds: Rhonchi (bilateral) present  Abdominal:      General: There is no distension  Palpations: Abdomen is soft  Tenderness:  There is no abdominal tenderness  Musculoskeletal:         General: No deformity  Normal range of motion  Right lower leg: No edema  Left lower leg: No edema  Skin:     Findings: No rash  Neurological:      General: No focal deficit present  Mental Status: He is alert and oriented to person, place, and time  Psychiatric:         Mood and Affect: Mood normal          Vital Signs  ED Triage Vitals [07/25/22 1045]   Temperature Pulse Respirations Blood Pressure SpO2   (!) 100 9 °F (38 3 °C) 102 22 156/84 98 %      Temp Source Heart Rate Source Patient Position - Orthostatic VS BP Location FiO2 (%)   Oral Monitor Sitting Left arm --      Pain Score       8           Vitals:    07/25/22 1045 07/25/22 1100 07/25/22 1200   BP: 156/84 137/69 128/64   Pulse: 102 98 91   Patient Position - Orthostatic VS: Sitting  Lying         Visual Acuity      ED Medications  Medications   albuterol (PROVENTIL HFA,VENTOLIN HFA) inhaler 2 puff (has no administration in time range)   diphenhydrAMINE (BENADRYL) injection 25 mg (25 mg Intravenous Given 7/25/22 1151)   metoclopramide (REGLAN) injection 10 mg (10 mg Intravenous Given 7/25/22 1152)   ketorolac (TORADOL) injection 15 mg (15 mg Intravenous Given 7/25/22 1149)   magnesium sulfate 2 g/50 mL IVPB (premix) 2 g (2 g Intravenous New Bag 7/25/22 1153)   sodium chloride 0 9 % bolus 1,000 mL (1,000 mL Intravenous New Bag 7/25/22 1149)       Diagnostic Studies  Results Reviewed     Procedure Component Value Units Date/Time    COVID only [[id number]]  (Abnormal) Collected: 07/25/22 1115    Lab Status: Final result Specimen: Nasopharyngeal Swab Updated: 07/25/22 1203     SARS-CoV-2 Positive    Narrative:      FOR PEDIATRIC PATIENTS - copy/paste COVID Guidelines URL to browser: [email or web address]    SARS-CoV-2 assay is a Nucleic Acid Amplification assay intended for the  qualitative detection of nucleic acid from SARS-CoV-2 in nasopharyngeal  swabs  Results are for the presumptive identification of SARS-CoV-2 RNA  Positive results are indicative of infection with SARS-CoV-2, the virus  causing COVID-19, but do not rule out bacterial infection or co-infection  with other viruses  Laboratories within the [place] and its  territories are required to report all positive results to the appropriate  public health authorities  Negative results do not preclude SARS-CoV-2  infection and should not be used as the sole basis for treatment or other  patient management decisions  Negative results must be combined with  clinical observations, patient history, and epidemiological information  This test has not been FDA cleared or approved  This test has been authorized by FDA under an Emergency Use Authorization  (EUA)  This test is only authorized for the duration of time the  declaration that circumstances exist justifying the authorization of the  emergency use of an in vitro diagnostic tests for detection of SARS-CoV-2  virus and/or diagnosis of COVID-19 infection under section 564(b)(1) of  the Act, 21 U  S C  240AIU-9(O)(1), unless the authorization is terminated  or revoked sooner  The test has been validated but independent review by FDA  and CLIA is pending  Test performed using [employer] Gene[employer]pert: This RT-PCR assay targets N2,  a region unique to SARS-CoV-2  A conserved region in the E-gene was chosen  for pan-Sarbecovirus detection which includes SARS-CoV-2      Procalcitonin [[id number]]  (Normal) Collected: 07/25/22 1053    Lab Status: Final result Specimen: Blood from Arm, Right Updated: 07/25/22 1151     Procalcitonin 0 17 ng/ml     HS Troponin 0hr (reflex protocol) [[id number]]  (Normal) Collected: 07/25/22 1053    Lab Status: Final result Specimen: Blood from Arm, Right Updated: 07/25/22 1150     hs TnI 0hr 4 ng/L     Lactic acid [[id number]]  (Normal) Collected: 07/25/22 1053    Lab Status: Final result Specimen: Blood from Arm, Right Updated: 07/25/22 1146     LACTIC ACID 1 4 mmol/L     Narrative:      Result may be elevated if tourniquet was used during collection      Comprehensive metabolic panel [[id number]] Collected: 07/25/22 1053    Lab Status: Final result Specimen: Blood from Arm, Right Updated: 07/25/22 1143     Sodium 137 mmol/L      Potassium 3 9 mmol/L      Chloride 100 mmol/L      CO2 27 mmol/L      ANION GAP 10 mmol/L      BUN 11 mg/dL      Creatinine 1 16 mg/dL      Glucose 104 mg/dL      Calcium 9 0 mg/dL      AST 19 U/L      ALT 30 U/L      Alkaline Phosphatase 61 U/L      Total Protein 7 5 g/dL      Albumin 3 6 g/dL      Total Bilirubin 0 50 mg/dL      eGFR 70 ml/min/1 73sq m     Narrative:      National Kidney Disease Foundation guidelines for Chronic Kidney Disease (CKD):     Stage 1 with normal or high GFR (GFR > 90 mL/min/1 73 square meters)    Stage 2 Mild CKD (GFR = 60-89 mL/min/1 73 square meters)    Stage 3A Moderate CKD (GFR = 45-59 mL/min/1 73 square meters)    Stage 3B Moderate CKD (GFR = 30-44 mL/min/1 73 square meters)    Stage 4 Severe CKD (GFR = 15-29 mL/min/1 73 square meters)    Stage 5 End Stage CKD (GFR <15 mL/min/1 73 square meters)  Note: GFR calculation is accurate only with a steady state creatinine    CBC and differential [[id number]]  (Abnormal) Collected: 07/25/22 1053    Lab Status: Final result Specimen: Blood from Arm, Right Updated: 07/25/22 1112     WBC 6 14 Thousand/uL      RBC 5 00 Million/uL      Hemoglobin 15 5 g/dL      Hematocrit 47 4 %      MCV 95 fL      MCH 31 0 pg      MCHC 32 7 g/dL      RDW 13 6 %      MPV 9 2 fL      Platelets 793 Thousands/uL      nRBC 0 /100 WBCs      Neutrophils Relative 77 %      Immat GRANS % 0 %      Lymphocytes Relative 4 %      Monocytes Relative 17 %      Eosinophils Relative 1 %      Basophils Relative 1 %      Neutrophils Absolute 4 72 Thousands/µL      Immature Grans Absolute 0 02 Thousand/uL      Lymphocytes Absolute 0 27 Thousands/µL Monocytes Absolute 1 06 Thousand/µL      Eosinophils Absolute 0 04 Thousand/µL      Basophils Absolute 0 03 Thousands/µL     Blood culture #2 [[id number]] Collected: 07/25/22 1053    Lab Status: In process Specimen: Blood from Arm, Right Updated: 07/25/22 1105    Blood culture #1 [[id number]] Collected: 07/25/22 1053    Lab Status: In process Specimen: Blood from Arm, Right Updated: 07/25/22 1105    APTT [[id number]] Collected: 07/25/22 1053    Lab Status: In process Specimen: Blood from Arm, Right Updated: 07/25/22 1104    Protime-INR [[id number]] Collected: 07/25/22 1053    Lab Status: In process Specimen: Blood from Arm, Right Updated: 07/25/22 1104                 XR chest 1 view portable   Final Result by Arcadio Boast, MD (07/25 1110)      No acute cardiopulmonary disease                    Workstation performed: [id number]                    Procedures  ECG 12 Lead Documentation Only    Date/Time: 7/25/2022 10:43 AM  Performed by: Radha Pretty MD  Authorized by: Radha Pretty MD     Indications / Diagnosis:  Shortness of breath  ECG reviewed by me, the ED Provider: yes    Patient location:  ED  Previous ECG:     Previous ECG:  Unavailable  Interpretation:     Interpretation: abnormal    Rate:     ECG rate:  101    ECG rate assessment: tachycardic    Rhythm:     Rhythm: sinus tachycardia    Ectopy:     Ectopy: none    QRS:     QRS axis:  Left    QRS intervals:  Normal  Conduction:     Conduction: abnormal      Abnormal conduction: incomplete RBBB    ST segments:     ST segments:  Normal  T waves:     T waves: normal               ED Course             HEART Risk Score    Flowsheet Row Most Recent Value   Heart Score Risk Calculator    History 0 Filed at: 07/25/2022 1200   ECG 0 Filed at: 07/25/2022 1200   Age 1 Filed at: 07/25/2022 1200   Risk Factors 2 Filed at: 07/25/2022 1200   Troponin 0 Filed at: 07/25/2022 1200   HEART Score 3 Filed at: 07/25/2022 1200 SBIRT 22yo+    Flowsheet Row Most Recent Value   SBIRT (25 yo +)    In order to provide better care to our patients, we are screening all of our patients for alcohol and drug use  Would it be okay to ask you these screening questions? Yes Filed at: 07/25/2022 1055   Initial Alcohol Screen: US AUDIT-C     1  How often do you have a drink containing alcohol? 0 Filed at: 07/25/2022 1055   2  How many drinks containing alcohol do you have on a typical day you are drinking? 0 Filed at: 07/25/2022 1055   3a  Male UNDER 65: How often do you have five or more drinks on one occasion? 0 Filed at: 07/25/2022 1055   Audit-C Score 0 Filed at: 07/25/2022 1055   KELECHI: How many times in the past year have you    Used an illegal drug or used a prescription medication for non-medical reasons? Never Filed at: 07/25/2022 1055                    MDM  Number of Diagnoses or Management Options  Acute headache: new and requires workup  Bronchitis: new and requires workup  Nausea: new and requires workup  Upper respiratory tract infection due to COVID-19 virus: new and requires workup  Diagnosis management comments: 65year old male presents for evaluation of URI symptoms associated with headache and shortness of breath  Labs unremarkable with the exception of positive COVID PCR  No infiltrate on CXR  HEART score 3  Paxlovid prescribed  PCP follow up  Return precautions provided          Amount and/or Complexity of Data Reviewed  Clinical lab tests: ordered and reviewed  Tests in the radiology section of CPT®: ordered and reviewed    Patient Progress  Patient progress: stable      Disposition  Final diagnoses:   Upper respiratory tract infection due to COVID-19 virus   Acute headache   Nausea   Bronchitis     Time reflects when diagnosis was documented in both MDM as applicable and the Disposition within this note     Time User Action Codes Description Comment    7/25/2022 12:04 PM Delicia Valentin Add [U07 1, J06 9] Upper respiratory tract infection due to COVID-19 virus     7/25/2022 12:04 PM Barbie Delicia David Add [R51 9] Acute headache     7/25/2022 12:32 PM [place] Fine Add [R11 0] Nausea     7/25/2022 12:56 PM Priscila Fine Add [J40] Bronchitis       ED Disposition     ED Disposition   Discharge    Condition   Stable    Date/Time   Mon Jul 25, 2022 12:56 PM    Comment   Rufino Chavez discharge to home/self care                 Follow-up Information     Follow up With Specialties Details Why Contact Info Additional Information    Jose E Gayle MD Family Medicine Schedule an appointment as soon as possible for a visit in 2 days please schedule a virtual visit for follow up [place] [address] [address] [facility] Emergency Department Emergency Medicine Go to  If symptoms worsen, severe shortness of breath with oxygen level remaining less than 92% [address] [place] [id number]  [address] [facility] Emergency Department, [address], [place] [address]          Patient's Medications   Discharge Prescriptions    ALBUTEROL (VENTOLIN HFA) 90 MCG/ACT INHALER    Inhale 1-2 puffs every 6 (six) hours as needed for wheezing or shortness of breath       Start Date: 7/25/2022 End Date: --       Order Dose: 1-2 puffs       Quantity: 8 g    Refills: 0    NIRMATRELVIR & RITONAVIR (PAXLOVID) TABLET THERAPY PACK    Take 3 tablets by mouth 2 (two) times a day for 5 days Take 2 nirmatrelvir tablets + 1 ritonavir tablet together per dose       Start Date: 7/25/2022 End Date: 7/30/2022       Order Dose: 3 tablets       Quantity: 30 tablet    Refills: 0    ONDANSETRON (ZOFRAN ODT) 4 MG DISINTEGRATING TABLET    Take 1 tablet (4 mg total) by mouth every 8 (eight) hours as needed for nausea or vomiting       Start Date: 7/25/2022 End Date: --       Order Dose: 4 mg       Quantity: 12 tablet    Refills: 0 No discharge procedures on file      PDMP Review     None          ED Provider  Electronically Signed by           Ashley Grace MD  07/25/22 8338

## 2022-07-25 NOTE — Clinical Note
Ceasar Bañuelos was seen and treated in our emergency department on 7/25/2022  Diagnosis:     Juan Manuel Chacko    He may return on this date: 08/04/2022         If you have any questions or concerns, please don't hesitate to call        Maryjo Rea MD    ______________________________           _______________          _______________  Hospital Representative                              Date                                Time

## 2022-07-26 LAB
ATRIAL RATE: 101 BPM
P AXIS: 65 DEGREES
PR INTERVAL: 130 MS
QRS AXIS: 265 DEGREES
QRSD INTERVAL: 98 MS
QT INTERVAL: 328 MS
QTC INTERVAL: 425 MS
T WAVE AXIS: 55 DEGREES
VENTRICULAR RATE: 101 BPM

## 2022-07-26 PROCEDURE — 93010 ELECTROCARDIOGRAM REPORT: CPT | Performed by: INTERNAL MEDICINE

## 2022-07-30 LAB
BACTERIA BLD CULT: NORMAL
BACTERIA BLD CULT: NORMAL

## 2022-10-12 PROBLEM — J98.8 VIRAL RESPIRATORY ILLNESS: Status: RESOLVED | Noted: 2022-01-19 | Resolved: 2022-10-12

## 2022-10-12 PROBLEM — B97.89 VIRAL RESPIRATORY ILLNESS: Status: RESOLVED | Noted: 2022-01-19 | Resolved: 2022-10-12
